# Patient Record
Sex: MALE | Race: WHITE | NOT HISPANIC OR LATINO
[De-identification: names, ages, dates, MRNs, and addresses within clinical notes are randomized per-mention and may not be internally consistent; named-entity substitution may affect disease eponyms.]

---

## 2017-09-17 ENCOUNTER — TRANSCRIPTION ENCOUNTER (OUTPATIENT)
Age: 42
End: 2017-09-17

## 2018-04-20 ENCOUNTER — APPOINTMENT (OUTPATIENT)
Dept: OTHER | Facility: CLINIC | Age: 43
End: 2018-04-20

## 2019-07-16 ENCOUNTER — LABORATORY RESULT (OUTPATIENT)
Age: 44
End: 2019-07-16

## 2019-07-16 ENCOUNTER — APPOINTMENT (OUTPATIENT)
Dept: CARDIOLOGY | Facility: CLINIC | Age: 44
End: 2019-07-16
Payer: COMMERCIAL

## 2019-07-16 ENCOUNTER — NON-APPOINTMENT (OUTPATIENT)
Age: 44
End: 2019-07-16

## 2019-07-16 VITALS
WEIGHT: 270 LBS | HEART RATE: 89 BPM | SYSTOLIC BLOOD PRESSURE: 130 MMHG | HEIGHT: 75 IN | BODY MASS INDEX: 33.57 KG/M2 | DIASTOLIC BLOOD PRESSURE: 88 MMHG | TEMPERATURE: 98.2 F | OXYGEN SATURATION: 96 %

## 2019-07-16 DIAGNOSIS — Z82.69 FAMILY HISTORY OF OTHER DISEASES OF THE MUSCULOSKELETAL SYSTEM AND CONNECTIVE TISSUE: ICD-10-CM

## 2019-07-16 DIAGNOSIS — M25.579 PAIN IN UNSPECIFIED ANKLE AND JOINTS OF UNSPECIFIED FOOT: ICD-10-CM

## 2019-07-16 DIAGNOSIS — Z83.3 FAMILY HISTORY OF DIABETES MELLITUS: ICD-10-CM

## 2019-07-16 PROCEDURE — 99396 PREV VISIT EST AGE 40-64: CPT

## 2019-07-16 PROCEDURE — 93000 ELECTROCARDIOGRAM COMPLETE: CPT

## 2019-07-16 NOTE — HISTORY OF PRESENT ILLNESS
[FreeTextEntry1] : physical  [de-identified] : This is a 43 year old gentlemen with a PMH of Gout, presents today for his annual wellness visit. He states he is feeling good aside for some "normal aches and pains." Patient denies dyspnea, palpitations, chest pain, nausea, vomiting, dizziness and lightheadedness.\par pt concerned about cysts on scalp /neck and back of leg .denies trauma no fever

## 2019-07-16 NOTE — PHYSICAL EXAM
[No Acute Distress] : no acute distress [Well Nourished] : well nourished [Well Developed] : well developed [Well-Appearing] : well-appearing [Normal Sclera/Conjunctiva] : normal sclera/conjunctiva [PERRL] : pupils equal round and reactive to light [EOMI] : extraocular movements intact [Normal Outer Ear/Nose] : the outer ears and nose were normal in appearance [Normal Oropharynx] : the oropharynx was normal [No JVD] : no jugular venous distention [No Lymphadenopathy] : no lymphadenopathy [Supple] : supple [Thyroid Normal, No Nodules] : the thyroid was normal and there were no nodules present [No Respiratory Distress] : no respiratory distress  [No Accessory Muscle Use] : no accessory muscle use [Clear to Auscultation] : lungs were clear to auscultation bilaterally [Normal Rate] : normal rate  [Regular Rhythm] : with a regular rhythm [Normal S1, S2] : normal S1 and S2 [No Murmur] : no murmur heard [No Carotid Bruits] : no carotid bruits [No Abdominal Bruit] : a ~M bruit was not heard ~T in the abdomen [No Varicosities] : no varicosities [Pedal Pulses Present] : the pedal pulses are present [No Edema] : there was no peripheral edema [No Palpable Aorta] : no palpable aorta [No Extremity Clubbing/Cyanosis] : no extremity clubbing/cyanosis [Soft] : abdomen soft [Non Tender] : non-tender [Non-distended] : non-distended [No Masses] : no abdominal mass palpated [No HSM] : no HSM [Normal Bowel Sounds] : normal bowel sounds [Normal Posterior Cervical Nodes] : no posterior cervical lymphadenopathy [Normal Anterior Cervical Nodes] : no anterior cervical lymphadenopathy [No CVA Tenderness] : no CVA  tenderness [No Spinal Tenderness] : no spinal tenderness [No Joint Swelling] : no joint swelling [Grossly Normal Strength/Tone] : grossly normal strength/tone [No Rash] : no rash [Coordination Grossly Intact] : coordination grossly intact [No Focal Deficits] : no focal deficits [Normal Gait] : normal gait [Deep Tendon Reflexes (DTR)] : deep tendon reflexes were 2+ and symmetric [Normal Affect] : the affect was normal [Normal Insight/Judgement] : insight and judgment were intact [FreeTextEntry1] : prostate normal  [de-identified] : Genitals and prostate normal [de-identified] : cyst scalp left and neck and left posterior calf area nevi noted

## 2019-07-24 LAB
25(OH)D3 SERPL-MCNC: 17.5 NG/ML
ALBUMIN SERPL ELPH-MCNC: 4.5 G/DL
ALP BLD-CCNC: 68 U/L
ALT SERPL-CCNC: 48 U/L
ANION GAP SERPL CALC-SCNC: 16 MMOL/L
AST SERPL-CCNC: 31 U/L
BASOPHILS # BLD AUTO: 0.08 K/UL
BASOPHILS NFR BLD AUTO: 1.3 %
BILIRUB SERPL-MCNC: 0.9 MG/DL
BUN SERPL-MCNC: 17 MG/DL
CALCIUM SERPL-MCNC: 9.4 MG/DL
CHLORIDE SERPL-SCNC: 103 MMOL/L
CHOLEST SERPL-MCNC: 157 MG/DL
CHOLEST/HDLC SERPL: 5.6 RATIO
CO2 SERPL-SCNC: 23 MMOL/L
CREAT SERPL-MCNC: 1 MG/DL
EOSINOPHIL # BLD AUTO: 0.11 K/UL
EOSINOPHIL NFR BLD AUTO: 1.8 %
ESTIMATED AVERAGE GLUCOSE: 192 MG/DL
GLUCOSE SERPL-MCNC: 137 MG/DL
HBA1C MFR BLD HPLC: 8.3 %
HCT VFR BLD CALC: 46.2 %
HDLC SERPL-MCNC: 28 MG/DL
HGB BLD-MCNC: 13.7 G/DL
IMM GRANULOCYTES NFR BLD AUTO: 0.5 %
LDLC SERPL CALC-MCNC: 77 MG/DL
LYMPHOCYTES # BLD AUTO: 1.66 K/UL
LYMPHOCYTES NFR BLD AUTO: 26.6 %
MAGNESIUM SERPL-MCNC: 2.1 MG/DL
MAN DIFF?: NORMAL
MCHC RBC-ENTMCNC: 20.1 PG
MCHC RBC-ENTMCNC: 29.7 GM/DL
MCV RBC AUTO: 67.7 FL
MONOCYTES # BLD AUTO: 0.45 K/UL
MONOCYTES NFR BLD AUTO: 7.2 %
NEUTROPHILS # BLD AUTO: 3.9 K/UL
NEUTROPHILS NFR BLD AUTO: 62.6 %
PHOSPHATE SERPL-MCNC: 5 MG/DL
PLATELET # BLD AUTO: 250 K/UL
POTASSIUM SERPL-SCNC: 4.1 MMOL/L
PROT SERPL-MCNC: 7.2 G/DL
PSA SERPL-MCNC: 0.36 NG/ML
RBC # BLD: 6.82 M/UL
RBC # FLD: 17.3 %
SODIUM SERPL-SCNC: 142 MMOL/L
T3RU NFR SERPL: 1.2 TBI
T4 FREE SERPL-MCNC: 1.2 NG/DL
T4 SERPL-MCNC: 7.8 UG/DL
TRIGL SERPL-MCNC: 258 MG/DL
TSH SERPL-ACNC: 1.18 UIU/ML
URATE SERPL-MCNC: 8.1 MG/DL
WBC # FLD AUTO: 6.23 K/UL

## 2019-08-19 ENCOUNTER — APPOINTMENT (OUTPATIENT)
Dept: CARDIOLOGY | Facility: CLINIC | Age: 44
End: 2019-08-19
Payer: COMMERCIAL

## 2019-08-19 ENCOUNTER — TRANSCRIPTION ENCOUNTER (OUTPATIENT)
Age: 44
End: 2019-08-19

## 2019-08-19 PROCEDURE — 93015 CV STRESS TEST SUPVJ I&R: CPT

## 2019-08-19 PROCEDURE — 93306 TTE W/DOPPLER COMPLETE: CPT

## 2019-10-30 ENCOUNTER — APPOINTMENT (OUTPATIENT)
Dept: ENDOCRINOLOGY | Facility: CLINIC | Age: 44
End: 2019-10-30

## 2020-06-02 ENCOUNTER — APPOINTMENT (OUTPATIENT)
Dept: OTHER | Facility: CLINIC | Age: 45
End: 2020-06-02
Payer: COMMERCIAL

## 2020-06-02 PROCEDURE — 99396 PREV VISIT EST AGE 40-64: CPT | Mod: 95

## 2020-06-03 NOTE — DISCUSSION/SUMMARY
[Home] : at home, [unfilled] , at the time of the visit. [Verbal consent obtained from patient] : the patient, [unfilled] [Other Location: e.g. Home (Enter Location, City,State)___] : at [unfilled] [Please See Note in Chart and Documentation in Trial DB] : Please see note in chart and documentation in Trial DB. 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 He states he is feeling good . Patient denies dyspnea, palpitations, chest pain, nausea, vomiting, dizziness and lightheadedness.\par \par  \par Active Problems\par Gout \par Osteoarthritis of foot joint \par Osteophyte of right foot \par Diabetes mellitus \par \par Surgical History\par History of Shoulder Surgery\par \par Family History\par Family history of diabetes mellitus (V18.0) (Z83.3) : Mother\par Family history of gout (V18.19) (Z82.69) : Mother, Father\par \par Social History\par Never a smoker\par No alcohol use\par No drug use\par \par Current Meds\par Colchicine 0.6 MG Oral Capsule\par Metformin \par Allergies\par No Known Drug Allergies\par \par Review of Systems: reviewed in Trial DB \par \par CXR and spirometry deferred \par \par \par Physical Exam: deferred \par  \par Assessment and Plan\par \par [facility] annual MV \par

## 2020-11-09 ENCOUNTER — LABORATORY RESULT (OUTPATIENT)
Age: 45
End: 2020-11-09

## 2020-11-09 ENCOUNTER — NON-APPOINTMENT (OUTPATIENT)
Age: 45
End: 2020-11-09

## 2020-11-09 ENCOUNTER — APPOINTMENT (OUTPATIENT)
Dept: CARDIOLOGY | Facility: CLINIC | Age: 45
End: 2020-11-09
Payer: COMMERCIAL

## 2020-11-09 VITALS
OXYGEN SATURATION: 96 % | BODY MASS INDEX: 33.07 KG/M2 | WEIGHT: 266 LBS | HEART RATE: 66 BPM | SYSTOLIC BLOOD PRESSURE: 122 MMHG | HEIGHT: 75 IN | DIASTOLIC BLOOD PRESSURE: 90 MMHG | TEMPERATURE: 98.2 F

## 2020-11-09 DIAGNOSIS — Z23 ENCOUNTER FOR IMMUNIZATION: ICD-10-CM

## 2020-11-09 DIAGNOSIS — Z20.828 CONTACT WITH AND (SUSPECTED) EXPOSURE TO OTHER VIRAL COMMUNICABLE DISEASES: ICD-10-CM

## 2020-11-09 DIAGNOSIS — L72.9 FOLLICULAR CYST OF THE SKIN AND SUBCUTANEOUS TISSUE, UNSPECIFIED: ICD-10-CM

## 2020-11-09 DIAGNOSIS — Z86.39 PERSONAL HISTORY OF OTHER ENDOCRINE, NUTRITIONAL AND METABOLIC DISEASE: ICD-10-CM

## 2020-11-09 DIAGNOSIS — D17.9 BENIGN LIPOMATOUS NEOPLASM, UNSPECIFIED: ICD-10-CM

## 2020-11-09 PROCEDURE — G0008: CPT

## 2020-11-09 PROCEDURE — 93000 ELECTROCARDIOGRAM COMPLETE: CPT

## 2020-11-09 PROCEDURE — 99396 PREV VISIT EST AGE 40-64: CPT | Mod: 25

## 2020-11-09 PROCEDURE — 99072 ADDL SUPL MATRL&STAF TM PHE: CPT

## 2020-11-09 PROCEDURE — 90686 IIV4 VACC NO PRSV 0.5 ML IM: CPT

## 2020-11-09 NOTE — HISTORY OF PRESENT ILLNESS
[FreeTextEntry1] : Annual Wellness Exam  [de-identified] : This is a 45 year old gentlemen with a PMH of Borderline HTN, HLD, Gout, and DM presents today for annual wellness exam. Patient states that his BS have been elevated and he states he has been feeling fatigued and tired. Patient has not been taking his metformin and has not followed with Dr. Rose. Patient denies dyspnea, palpitations, chest pain, nausea, vomiting, dizziness and lightheadedness.\par

## 2020-11-13 ENCOUNTER — NON-APPOINTMENT (OUTPATIENT)
Age: 45
End: 2020-11-13

## 2020-11-18 DIAGNOSIS — Z71.89 OTHER SPECIFIED COUNSELING: ICD-10-CM

## 2020-11-23 ENCOUNTER — APPOINTMENT (OUTPATIENT)
Dept: ENDOCRINOLOGY | Facility: CLINIC | Age: 45
End: 2020-11-23
Payer: COMMERCIAL

## 2020-11-23 VITALS
HEART RATE: 85 BPM | HEIGHT: 75 IN | TEMPERATURE: 98.5 F | DIASTOLIC BLOOD PRESSURE: 80 MMHG | WEIGHT: 265 LBS | OXYGEN SATURATION: 97 % | SYSTOLIC BLOOD PRESSURE: 118 MMHG | BODY MASS INDEX: 32.95 KG/M2

## 2020-11-23 PROCEDURE — 99072 ADDL SUPL MATRL&STAF TM PHE: CPT

## 2020-11-23 PROCEDURE — 99244 OFF/OP CNSLTJ NEW/EST MOD 40: CPT

## 2020-11-23 PROCEDURE — 82962 GLUCOSE BLOOD TEST: CPT

## 2020-11-23 PROCEDURE — 99214 OFFICE O/P EST MOD 30 MIN: CPT

## 2020-11-23 NOTE — HISTORY OF PRESENT ILLNESS
[FreeTextEntry1] : Mr. LACEY PIERCE  is a 45 year  year-old  male  who presents for initial endocrine evaluation with regard to a hx of type 2 dm. The diabetes has been present for about  under 2 years   years. He denies any history of  retinopathy, neuropathy or  nephropathy.500 mg daily. Recent A1c was 8.3% He has recently cut back on carbs  He   denies polyuria, polydipsia, or any visual changes. He  too denies any skin lesions, skin breakdown or non-healing areas of skin. He too denies any podiatric concerns. Ophthalmologic evaluation is due--not done for some time.Home glucose monitoring from his moms meter showd values nmid 100's-he needs his own meter  He  does deny any hypoglycemia or hypoglycemic signs or symptoms.\par Additional medical history includes that of  gout\par FH Mom with type 2 dm\par Operates heavy equipment\par \par

## 2020-12-06 LAB — GLUCOSE BLDC GLUCOMTR-MCNC: 169

## 2020-12-16 ENCOUNTER — APPOINTMENT (OUTPATIENT)
Dept: CARDIOLOGY | Facility: CLINIC | Age: 45
End: 2020-12-16

## 2021-02-22 ENCOUNTER — APPOINTMENT (OUTPATIENT)
Dept: ENDOCRINOLOGY | Facility: CLINIC | Age: 46
End: 2021-02-22
Payer: COMMERCIAL

## 2021-02-22 VITALS
HEART RATE: 78 BPM | WEIGHT: 270 LBS | BODY MASS INDEX: 33.57 KG/M2 | DIASTOLIC BLOOD PRESSURE: 82 MMHG | RESPIRATION RATE: 16 BRPM | OXYGEN SATURATION: 98 % | SYSTOLIC BLOOD PRESSURE: 120 MMHG | HEIGHT: 75 IN | TEMPERATURE: 98.6 F

## 2021-02-22 PROCEDURE — 36415 COLL VENOUS BLD VENIPUNCTURE: CPT

## 2021-02-22 PROCEDURE — 99214 OFFICE O/P EST MOD 30 MIN: CPT | Mod: 25

## 2021-02-22 PROCEDURE — 99072 ADDL SUPL MATRL&STAF TM PHE: CPT

## 2021-02-22 PROCEDURE — 83036 HEMOGLOBIN GLYCOSYLATED A1C: CPT | Mod: QW

## 2021-02-22 PROCEDURE — 82962 GLUCOSE BLOOD TEST: CPT

## 2021-02-24 LAB
GLUCOSE BLDC GLUCOMTR-MCNC: 117
HBA1C MFR BLD HPLC: 7.1

## 2021-02-28 NOTE — HISTORY OF PRESENT ILLNESS
[FreeTextEntry1] : Mr. LACEY PIERCE  is a 45 year old male who returns  with regard to a hx of type 2 DM. The diabetes has been present for about  under 2 years.. He denies any history of  retinopathy, neuropathy or  nephropathy. He does take Metformin Er 500 mg daily.   He  denies polyuria, polydipsia, or any visual changes. He  too denies any skin lesions, skin breakdown or non-healing areas of skin. He too denies any podiatric concerns. Ophthalmologic evaluation is due--not done for some time .Home glucose monitoring showed values of 120s at bedtime 1 hour after eating. He  does deny any hypoglycemia or hypoglycemic signs or symptoms.\par Additional medical history includes that of gout\par Operates heavy equipment\par States his weight fluctuates\par POCT glucose returned today at 117 mg/dL\par POCT A1c returned today at  7.1%   previously 8.3% 11/2020\par

## 2021-04-28 ENCOUNTER — APPOINTMENT (OUTPATIENT)
Dept: ENDOCRINOLOGY | Facility: CLINIC | Age: 46
End: 2021-04-28

## 2021-06-08 ENCOUNTER — APPOINTMENT (OUTPATIENT)
Dept: OTHER | Facility: CLINIC | Age: 46
End: 2021-06-08
Payer: COMMERCIAL

## 2021-06-08 PROCEDURE — 99396 PREV VISIT EST AGE 40-64: CPT | Mod: 95

## 2021-06-09 NOTE — DISCUSSION/SUMMARY
[Patient seen for WTC Monitoring ___] : Patient was seen for WTC monitoring [unfilled] [Please See Note in Chart and Documentation in Trial DB] : Please see note in chart and documentation in Trial DB. 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 He states he is feeling good . Patient denies dyspnea, palpitations, chest pain, nausea, vomiting, dizziness and lightheadedness.\par \par  \par Active Problems\par Gout \par Osteoarthritis of foot joint \par Osteophyte of right foot \par Diabetes mellitus \par \par Surgical History\par History of Shoulder Surgery\par \par Family History\par Family history of diabetes mellitus (V18.0) (Z83.3) : Mother\par Family history of gout (V18.19) (Z82.69) : Mother, Father\par \par Social History\par Never a smoker\par No alcohol use\par No drug use\par \par \par Allergies\par No Known Drug Allergies\par \par Review of Systems: reviewed in Trial DB \par \par CXR - referred \par \par \par Physical Exam: in trial DB \par  \par Assessment and Plan\par \par WTC annual MV \par

## 2022-03-24 ENCOUNTER — APPOINTMENT (OUTPATIENT)
Dept: CARDIOLOGY | Facility: CLINIC | Age: 47
End: 2022-03-24

## 2022-03-30 ENCOUNTER — APPOINTMENT (OUTPATIENT)
Dept: ENDOCRINOLOGY | Facility: CLINIC | Age: 47
End: 2022-03-30
Payer: COMMERCIAL

## 2022-03-30 VITALS
BODY MASS INDEX: 33.82 KG/M2 | WEIGHT: 272 LBS | DIASTOLIC BLOOD PRESSURE: 80 MMHG | HEART RATE: 83 BPM | TEMPERATURE: 98.6 F | OXYGEN SATURATION: 98 % | SYSTOLIC BLOOD PRESSURE: 138 MMHG | RESPIRATION RATE: 15 BRPM | HEIGHT: 75 IN

## 2022-03-30 DIAGNOSIS — Z86.16 PERSONAL HISTORY OF COVID-19: ICD-10-CM

## 2022-03-30 LAB
GLUCOSE BLDC GLUCOMTR-MCNC: 194
HBA1C MFR BLD HPLC: 11.5

## 2022-03-30 PROCEDURE — 82962 GLUCOSE BLOOD TEST: CPT | Mod: NC

## 2022-03-30 PROCEDURE — 99214 OFFICE O/P EST MOD 30 MIN: CPT

## 2022-03-30 PROCEDURE — 83036 HEMOGLOBIN GLYCOSYLATED A1C: CPT | Mod: QW

## 2022-03-30 NOTE — HISTORY OF PRESENT ILLNESS
[FreeTextEntry1] : Mr. LACEY PEIRCE is a 46 year old male who returns with regard to a hx of type 2 DM. The diabetes has been present for about under 2 years.. He denies any history of retinopathy, neuropathy or nephropathy. He does take Metformin Er 500 mg BID but did ran out of prescription ( about 7 months), He denies polyuria, polydipsia, or any visual changes. He too denies any skin lesions, skin breakdown or non-healing areas of skin. He too denies any podiatric concerns. Ophthalmologic evaluation is due--not done for some time. He has not tested his glucose levels at home and ran out of testing strips. He does deny any hypoglycemia or hypoglycemic signs or symptoms.\par \par Additional medical history includes that of gout\par Medications - colchicine as needed - no over the counter supplements \par Operates heavy equipment\par States his weight fluctuates\par \par POCT glucose returned today at 194 mg/dL\par POCT A1c returned today at 11.5% ; previously 2/22/2022 at 7.1% previously 8.3% 11/2020\par \par Covid Infection - 12/2021 not hospitalized; no residual symptoms \par Denies any recent hospitalizations

## 2022-03-31 LAB
25(OH)D3 SERPL-MCNC: 17.2 NG/ML
ALBUMIN SERPL ELPH-MCNC: 4.4 G/DL
ALP BLD-CCNC: 78 U/L
ALT SERPL-CCNC: 49 U/L
ANION GAP SERPL CALC-SCNC: 15 MMOL/L
AST SERPL-CCNC: 31 U/L
BILIRUB SERPL-MCNC: 0.8 MG/DL
BUN SERPL-MCNC: 14 MG/DL
CALCIUM SERPL-MCNC: 9.2 MG/DL
CHLORIDE SERPL-SCNC: 102 MMOL/L
CHOLEST SERPL-MCNC: 151 MG/DL
CO2 SERPL-SCNC: 22 MMOL/L
CREAT SERPL-MCNC: 0.95 MG/DL
CREAT SPEC-SCNC: 158 MG/DL
EGFR: 99 ML/MIN/1.73M2
FRUCTOSAMINE SERPL-MCNC: 339 UMOL/L
GLUCOSE SERPL-MCNC: 178 MG/DL
GLYCOMARK.: 1.4 UG/ML
HDLC SERPL-MCNC: 26 MG/DL
LDLC SERPL CALC-MCNC: 63 MG/DL
MICROALBUMIN 24H UR DL<=1MG/L-MCNC: <1.2 MG/DL
MICROALBUMIN/CREAT 24H UR-RTO: NORMAL MG/G
NONHDLC SERPL-MCNC: 125 MG/DL
POTASSIUM SERPL-SCNC: 4.2 MMOL/L
PROT SERPL-MCNC: 7.3 G/DL
SODIUM SERPL-SCNC: 139 MMOL/L
T3FREE SERPL-MCNC: 3.32 PG/ML
T4 FREE SERPL-MCNC: 1.1 NG/DL
TRIGL SERPL-MCNC: 311 MG/DL
TSH SERPL-ACNC: 1.04 UIU/ML
URATE SERPL-MCNC: 6 MG/DL

## 2022-03-31 RX ORDER — CHOLECALCIFEROL (VITAMIN D3) 1250 MCG
1.25 MG CAPSULE ORAL
Qty: 12 | Refills: 0 | Status: ACTIVE | COMMUNITY
Start: 2022-03-31 | End: 1900-01-01

## 2022-04-06 ENCOUNTER — APPOINTMENT (OUTPATIENT)
Dept: CARDIOLOGY | Facility: CLINIC | Age: 47
End: 2022-04-06
Payer: COMMERCIAL

## 2022-04-06 ENCOUNTER — NON-APPOINTMENT (OUTPATIENT)
Age: 47
End: 2022-04-06

## 2022-04-06 VITALS
OXYGEN SATURATION: 98 % | SYSTOLIC BLOOD PRESSURE: 130 MMHG | TEMPERATURE: 97.5 F | HEART RATE: 86 BPM | DIASTOLIC BLOOD PRESSURE: 82 MMHG | HEIGHT: 75 IN | WEIGHT: 267 LBS | BODY MASS INDEX: 33.2 KG/M2

## 2022-04-06 DIAGNOSIS — Z13.6 ENCOUNTER FOR SCREENING FOR CARDIOVASCULAR DISORDERS: ICD-10-CM

## 2022-04-06 DIAGNOSIS — R94.5 ABNORMAL RESULTS OF LIVER FUNCTION STUDIES: ICD-10-CM

## 2022-04-06 DIAGNOSIS — D22.9 MELANOCYTIC NEVI, UNSPECIFIED: ICD-10-CM

## 2022-04-06 DIAGNOSIS — Z12.5 ENCOUNTER FOR SCREENING FOR MALIGNANT NEOPLASM OF PROSTATE: ICD-10-CM

## 2022-04-06 PROCEDURE — 99396 PREV VISIT EST AGE 40-64: CPT

## 2022-04-06 PROCEDURE — 93000 ELECTROCARDIOGRAM COMPLETE: CPT

## 2022-04-06 NOTE — PHYSICAL EXAM
[No Acute Distress] : no acute distress [Well Nourished] : well nourished [Well Developed] : well developed [Well-Appearing] : well-appearing [Normal Sclera/Conjunctiva] : normal sclera/conjunctiva [PERRL] : pupils equal round and reactive to light [EOMI] : extraocular movements intact [Normal Outer Ear/Nose] : the outer ears and nose were normal in appearance [Normal Oropharynx] : the oropharynx was normal [No JVD] : no jugular venous distention [No Lymphadenopathy] : no lymphadenopathy [Supple] : supple [Thyroid Normal, No Nodules] : the thyroid was normal and there were no nodules present [No Respiratory Distress] : no respiratory distress  [No Accessory Muscle Use] : no accessory muscle use [Clear to Auscultation] : lungs were clear to auscultation bilaterally [Normal Rate] : normal rate  [Regular Rhythm] : with a regular rhythm [Normal S1, S2] : normal S1 and S2 [No Murmur] : no murmur heard [No Carotid Bruits] : no carotid bruits [No Abdominal Bruit] : a ~M bruit was not heard ~T in the abdomen [No Varicosities] : no varicosities [Pedal Pulses Present] : the pedal pulses are present [No Edema] : there was no peripheral edema [No Palpable Aorta] : no palpable aorta [No Extremity Clubbing/Cyanosis] : no extremity clubbing/cyanosis [Soft] : abdomen soft [Non Tender] : non-tender [Non-distended] : non-distended [No Masses] : no abdominal mass palpated [No HSM] : no HSM [Normal Bowel Sounds] : normal bowel sounds [Normal Posterior Cervical Nodes] : no posterior cervical lymphadenopathy [Normal Anterior Cervical Nodes] : no anterior cervical lymphadenopathy [No CVA Tenderness] : no CVA  tenderness [No Spinal Tenderness] : no spinal tenderness [No Joint Swelling] : no joint swelling [Grossly Normal Strength/Tone] : grossly normal strength/tone [No Rash] : no rash [Coordination Grossly Intact] : coordination grossly intact [No Focal Deficits] : no focal deficits [Normal Gait] : normal gait [Deep Tendon Reflexes (DTR)] : deep tendon reflexes were 2+ and symmetric [Normal Affect] : the affect was normal [Normal Insight/Judgement] : insight and judgment were intact [FreeTextEntry1] : refuses rectal exam  [de-identified] : refused genital exam  [de-identified] : nevi

## 2022-04-06 NOTE — HISTORY OF PRESENT ILLNESS
[FreeTextEntry1] : Annual Physical [de-identified] : This is a 46 year old gentlemen with a PMH of Borderline HTN, HLD, Gout, and DM presents today for annual wellness exam. He has no acute complaints or concerns at todays visit. He had follow up with Dr. Rose and is now compliant with his DM medicaitons and has been checking his BG readings regularly. Denies chest pain, dyspnea, dizziness, palpations, changes in appetite/bowel habits, nausea, vomiting, abdominal pain.\par

## 2022-04-11 LAB — 17OHP SERPL-MCNC: 45 NG/DL

## 2022-04-18 ENCOUNTER — EMERGENCY (EMERGENCY)
Facility: HOSPITAL | Age: 47
LOS: 1 days | Discharge: HOME | End: 2022-04-18
Attending: EMERGENCY MEDICINE
Payer: COMMERCIAL

## 2022-04-18 ENCOUNTER — NON-APPOINTMENT (OUTPATIENT)
Age: 47
End: 2022-04-18

## 2022-04-18 VITALS
TEMPERATURE: 98 F | DIASTOLIC BLOOD PRESSURE: 93 MMHG | OXYGEN SATURATION: 100 % | HEIGHT: 74 IN | HEART RATE: 72 BPM | SYSTOLIC BLOOD PRESSURE: 184 MMHG | WEIGHT: 263.01 LBS | RESPIRATION RATE: 20 BRPM

## 2022-04-18 VITALS
DIASTOLIC BLOOD PRESSURE: 89 MMHG | HEART RATE: 73 BPM | OXYGEN SATURATION: 100 % | RESPIRATION RATE: 19 BRPM | SYSTOLIC BLOOD PRESSURE: 168 MMHG | TEMPERATURE: 98 F

## 2022-04-18 DIAGNOSIS — Z98.89 OTHER SPECIFIED POSTPROCEDURAL STATES: Chronic | ICD-10-CM

## 2022-04-18 LAB
ALBUMIN SERPL ELPH-MCNC: 4.4 G/DL — SIGNIFICANT CHANGE UP (ref 3.3–5)
ALP SERPL-CCNC: 68 U/L — SIGNIFICANT CHANGE UP (ref 40–120)
ALT FLD-CCNC: 41 U/L — SIGNIFICANT CHANGE UP (ref 10–45)
ANION GAP SERPL CALC-SCNC: 16 MMOL/L — SIGNIFICANT CHANGE UP (ref 5–17)
ANISOCYTOSIS BLD QL: SIGNIFICANT CHANGE UP
APPEARANCE UR: CLEAR — SIGNIFICANT CHANGE UP
AST SERPL-CCNC: 35 U/L — SIGNIFICANT CHANGE UP (ref 10–40)
BACTERIA # UR AUTO: NEGATIVE — SIGNIFICANT CHANGE UP
BASOPHILS # BLD AUTO: 0.07 K/UL — SIGNIFICANT CHANGE UP (ref 0–0.2)
BASOPHILS NFR BLD AUTO: 0.9 % — SIGNIFICANT CHANGE UP (ref 0–2)
BILIRUB SERPL-MCNC: 0.5 MG/DL — SIGNIFICANT CHANGE UP (ref 0.2–1.2)
BILIRUB UR-MCNC: NEGATIVE — SIGNIFICANT CHANGE UP
BUN SERPL-MCNC: 18 MG/DL — SIGNIFICANT CHANGE UP (ref 7–23)
CALCIUM SERPL-MCNC: 9.3 MG/DL — SIGNIFICANT CHANGE UP (ref 8.4–10.5)
CHLORIDE SERPL-SCNC: 103 MMOL/L — SIGNIFICANT CHANGE UP (ref 96–108)
CO2 SERPL-SCNC: 21 MMOL/L — LOW (ref 22–31)
COLOR SPEC: COLORLESS — SIGNIFICANT CHANGE UP
CREAT SERPL-MCNC: 0.94 MG/DL — SIGNIFICANT CHANGE UP (ref 0.5–1.3)
DACRYOCYTES BLD QL SMEAR: SLIGHT — SIGNIFICANT CHANGE UP
DIFF PNL FLD: NEGATIVE — SIGNIFICANT CHANGE UP
EGFR: 100 ML/MIN/1.73M2 — SIGNIFICANT CHANGE UP
ELLIPTOCYTES BLD QL SMEAR: SLIGHT — SIGNIFICANT CHANGE UP
EOSINOPHIL # BLD AUTO: 0.06 K/UL — SIGNIFICANT CHANGE UP (ref 0–0.5)
EOSINOPHIL NFR BLD AUTO: 0.8 % — SIGNIFICANT CHANGE UP (ref 0–6)
EPI CELLS # UR: 0 /HPF — SIGNIFICANT CHANGE UP
GLUCOSE SERPL-MCNC: 129 MG/DL — HIGH (ref 70–99)
GLUCOSE UR QL: NEGATIVE — SIGNIFICANT CHANGE UP
HCT VFR BLD CALC: 41.6 % — SIGNIFICANT CHANGE UP (ref 39–50)
HGB BLD-MCNC: 12.8 G/DL — LOW (ref 13–17)
HYALINE CASTS # UR AUTO: 1 /LPF — SIGNIFICANT CHANGE UP (ref 0–2)
KETONES UR-MCNC: SIGNIFICANT CHANGE UP
LEUKOCYTE ESTERASE UR-ACNC: NEGATIVE — SIGNIFICANT CHANGE UP
LIDOCAIN IGE QN: 124 U/L — HIGH (ref 7–60)
LYMPHOCYTES # BLD AUTO: 1.1 K/UL — SIGNIFICANT CHANGE UP (ref 1–3.3)
LYMPHOCYTES # BLD AUTO: 14.9 % — SIGNIFICANT CHANGE UP (ref 13–44)
MANUAL SMEAR VERIFICATION: SIGNIFICANT CHANGE UP
MCHC RBC-ENTMCNC: 19.9 PG — LOW (ref 27–34)
MCHC RBC-ENTMCNC: 30.8 GM/DL — LOW (ref 32–36)
MCV RBC AUTO: 64.8 FL — LOW (ref 80–100)
METAMYELOCYTES # FLD: 0.9 % — HIGH (ref 0–0)
MICROCYTES BLD QL: SIGNIFICANT CHANGE UP
MONOCYTES # BLD AUTO: 0.32 K/UL — SIGNIFICANT CHANGE UP (ref 0–0.9)
MONOCYTES NFR BLD AUTO: 4.4 % — SIGNIFICANT CHANGE UP (ref 2–14)
NEUTROPHILS # BLD AUTO: 5.75 K/UL — SIGNIFICANT CHANGE UP (ref 1.8–7.4)
NEUTROPHILS NFR BLD AUTO: 77.2 % — HIGH (ref 43–77)
NEUTS BAND # BLD: 0.9 % — SIGNIFICANT CHANGE UP (ref 0–8)
NITRITE UR-MCNC: NEGATIVE — SIGNIFICANT CHANGE UP
OVALOCYTES BLD QL SMEAR: SIGNIFICANT CHANGE UP
PH UR: 6 — SIGNIFICANT CHANGE UP (ref 5–8)
PLAT MORPH BLD: NORMAL — SIGNIFICANT CHANGE UP
PLATELET # BLD AUTO: 265 K/UL — SIGNIFICANT CHANGE UP (ref 150–400)
POIKILOCYTOSIS BLD QL AUTO: SLIGHT — SIGNIFICANT CHANGE UP
POTASSIUM SERPL-MCNC: 4.1 MMOL/L — SIGNIFICANT CHANGE UP (ref 3.5–5.3)
POTASSIUM SERPL-SCNC: 4.1 MMOL/L — SIGNIFICANT CHANGE UP (ref 3.5–5.3)
PROT SERPL-MCNC: 7.4 G/DL — SIGNIFICANT CHANGE UP (ref 6–8.3)
PROT UR-MCNC: NEGATIVE — SIGNIFICANT CHANGE UP
RBC # BLD: 6.42 M/UL — HIGH (ref 4.2–5.8)
RBC # FLD: 16.3 % — HIGH (ref 10.3–14.5)
RBC BLD AUTO: ABNORMAL
SARS-COV-2 RNA SPEC QL NAA+PROBE: SIGNIFICANT CHANGE UP
SODIUM SERPL-SCNC: 140 MMOL/L — SIGNIFICANT CHANGE UP (ref 135–145)
SP GR SPEC: 1.02 — SIGNIFICANT CHANGE UP (ref 1.01–1.02)
TARGETS BLD QL SMEAR: SLIGHT — SIGNIFICANT CHANGE UP
UROBILINOGEN FLD QL: NEGATIVE — SIGNIFICANT CHANGE UP
WBC # BLD: 7.36 K/UL — SIGNIFICANT CHANGE UP (ref 3.8–10.5)
WBC # FLD AUTO: 7.36 K/UL — SIGNIFICANT CHANGE UP (ref 3.8–10.5)
WBC UR QL: 0 /HPF — SIGNIFICANT CHANGE UP (ref 0–5)

## 2022-04-18 PROCEDURE — 87086 URINE CULTURE/COLONY COUNT: CPT

## 2022-04-18 PROCEDURE — 81001 URINALYSIS AUTO W/SCOPE: CPT

## 2022-04-18 PROCEDURE — 74177 CT ABD & PELVIS W/CONTRAST: CPT | Mod: MA

## 2022-04-18 PROCEDURE — 96374 THER/PROPH/DIAG INJ IV PUSH: CPT | Mod: XU

## 2022-04-18 PROCEDURE — 80053 COMPREHEN METABOLIC PANEL: CPT

## 2022-04-18 PROCEDURE — 93005 ELECTROCARDIOGRAM TRACING: CPT

## 2022-04-18 PROCEDURE — 83690 ASSAY OF LIPASE: CPT

## 2022-04-18 PROCEDURE — 96375 TX/PRO/DX INJ NEW DRUG ADDON: CPT

## 2022-04-18 PROCEDURE — 85025 COMPLETE CBC W/AUTO DIFF WBC: CPT

## 2022-04-18 PROCEDURE — 99285 EMERGENCY DEPT VISIT HI MDM: CPT | Mod: 25

## 2022-04-18 PROCEDURE — 93010 ELECTROCARDIOGRAM REPORT: CPT | Mod: NC

## 2022-04-18 PROCEDURE — 87635 SARS-COV-2 COVID-19 AMP PRB: CPT

## 2022-04-18 PROCEDURE — 76705 ECHO EXAM OF ABDOMEN: CPT

## 2022-04-18 PROCEDURE — 74177 CT ABD & PELVIS W/CONTRAST: CPT | Mod: 26,MA

## 2022-04-18 RX ORDER — ONDANSETRON 8 MG/1
4 TABLET, FILM COATED ORAL ONCE
Refills: 0 | Status: COMPLETED | OUTPATIENT
Start: 2022-04-18 | End: 2022-04-18

## 2022-04-18 RX ORDER — SUCRALFATE 1 G
1 TABLET ORAL ONCE
Refills: 0 | Status: COMPLETED | OUTPATIENT
Start: 2022-04-18 | End: 2022-04-18

## 2022-04-18 RX ORDER — SODIUM CHLORIDE 9 MG/ML
1000 INJECTION INTRAMUSCULAR; INTRAVENOUS; SUBCUTANEOUS ONCE
Refills: 0 | Status: COMPLETED | OUTPATIENT
Start: 2022-04-18 | End: 2022-04-18

## 2022-04-18 RX ORDER — KETOROLAC TROMETHAMINE 30 MG/ML
15 SYRINGE (ML) INJECTION ONCE
Refills: 0 | Status: DISCONTINUED | OUTPATIENT
Start: 2022-04-18 | End: 2022-04-18

## 2022-04-18 RX ORDER — FAMOTIDINE 10 MG/ML
20 INJECTION INTRAVENOUS ONCE
Refills: 0 | Status: COMPLETED | OUTPATIENT
Start: 2022-04-18 | End: 2022-04-18

## 2022-04-18 RX ORDER — MORPHINE SULFATE 50 MG/1
4 CAPSULE, EXTENDED RELEASE ORAL ONCE
Refills: 0 | Status: DISCONTINUED | OUTPATIENT
Start: 2022-04-18 | End: 2022-04-18

## 2022-04-18 RX ADMIN — FAMOTIDINE 20 MILLIGRAM(S): 10 INJECTION INTRAVENOUS at 06:21

## 2022-04-18 RX ADMIN — SODIUM CHLORIDE 1000 MILLILITER(S): 9 INJECTION INTRAMUSCULAR; INTRAVENOUS; SUBCUTANEOUS at 03:27

## 2022-04-18 RX ADMIN — Medication 15 MILLIGRAM(S): at 03:28

## 2022-04-18 RX ADMIN — MORPHINE SULFATE 4 MILLIGRAM(S): 50 CAPSULE, EXTENDED RELEASE ORAL at 03:52

## 2022-04-18 RX ADMIN — ONDANSETRON 4 MILLIGRAM(S): 8 TABLET, FILM COATED ORAL at 03:27

## 2022-04-18 RX ADMIN — Medication 1 GRAM(S): at 06:21

## 2022-04-18 NOTE — ED PROVIDER NOTE - CLINICAL SUMMARY MEDICAL DECISION MAKING FREE TEXT BOX
45 yo M with PMH of DM, no PSH p/w l sided abdominal pain since 10 pm (4hours). This is concerning for kidney stones vs. pancreatitis vs. diverticulitis. Pt writhing, can't sit still, appears uncomfortable in exam room. on exam + CVA tenderness. Pt seems like he has a kidney stone, will get CTAP, cbc, cmp, ua, uc, ecg, covid, lipase. will give toradol, fluids, zofran and reassess.

## 2022-04-18 NOTE — ED PROVIDER NOTE - PHYSICAL EXAMINATION
General: WN/WD NAD  Head: Atraumatic, normocephalic  Eyes: EOM grossly in tact, no scleral icterus, no discharge  ENT: moist mucous membranes  Neurology: A&Ox 3, nonfocal, BLUNT x 4  Respiratory: CTAB, no wheezing, normal respiratory effort  CV: RRR, good s1/s2, no S3, Extremities warm and well perfused  Abdominal: Soft, non-distended, TTP in L flank  Extremities: No edema, no deformities  Skin: warm and dry. No rashes  Back: + CVA tenderness on L

## 2022-04-18 NOTE — ED PROVIDER NOTE - PROGRESS NOTE DETAILS
Estefani Srivastava, PGY-1, EM:  Pt re-assessed, still in significant amount of pain. Will order additional medications.   lipase slightly elevated, pt does not drink. Estefani Srivastava, PGY-1, EM:  PT having some improvement of pain. Kidney stone present on CT, awaiting official results. Estefani Srivastava, PGY-1, EM:  PT having some improvement of pain. Estefani Srivastava, PGY-1, EM: Incidental finding found on patient imaging discussed.

## 2022-04-18 NOTE — ED PROVIDER NOTE - ATTENDING CONTRIBUTION TO CARE
Attending Statement (NIR Babin MD):    HPI: 47y/o M with h/o DM, gout, c/o left flank/abdominal pain which began suddenly approximately 4 hours prior to arrival; severe/constant, non-radiating, +nausea, 1 episode nb/nb vomiting at pain onset. no fever.  no groin/testicular pain    Review of Systems:  -General: no fever   -ENT: no congestion, no difficulty swallowing  -Pulmonary: no cough, no shortness of breath  -Cardiac: no chest pain, no palpitations  -Gastrointestinal: no abdominal pain, no nausea, no vomiting, and no diarrhea.  -Genitourinary: no blood or pain with urination  -Musculoskeletal: no back or neck pain  -Skin: no rashes  -Endocrine: No h/o diabetes   -Neurologic: No new weakness or numbness in extremities    All else negative unless otherwise specified elsewhere in this note.    PSH/PMH as noted above    On Physical Exam:  General: appears uncomfortable but is awake/alert, speaking clearly in full sentences and without difficulty; cooperative with exam  HEENT: anicteric sclera  Neck: no JVD  Cardiac: normal s1, s2; RRR; no MGR  Lungs: CTABL  Abdomen: soft nontender/nondistended  Back: +L CVA tenderness  : no bladder tenderness or distension  Skin: intact, no rash  Extremities: no peripheral edema, no gross deformities      MDM: L flank pain, acute onset, concerning for renal colic; less likely diverticulitis/colitis (lack of other GI symptoms); unlikely aortic catastrophe; will check screening labs: cbc (to evaluate for leukocytosis or anemia), CMP (to evaluate for electrolyte abnormalities or renal/liver dysfunction) and ua; obtain CTAP to further evaluate; offer pain medication prn.  disposition pending review of labs/imaging.

## 2022-04-18 NOTE — ED PROVIDER NOTE - OBJECTIVE STATEMENT
47 yo M with PMH of DM, no PSH p/w l sided abdominal pain since 10 pm (4hours). Pt states pain is sharp, radiating towards his groin. He's never felt it before. States the pain is constant. 1 episode of NBNB vomiting. No fevers, no diarrhea, no constipation, no HA. No past abdominal surgeries. No recent travel. Hasn't taken any pain medications. Urinating like normal.

## 2022-04-18 NOTE — ED ADULT NURSE NOTE - OBJECTIVE STATEMENT
45 yo M with PMH of DM, no PSH p/w l sided abdominal pain since 10 pm (4hours). Pt states pain is sharp, radiating towards his groin. He's never felt it before. States the pain is constant. 1 episode of NBNB vomiting. No fevers, no diarrhea, no constipation, no HA. No past abdominal surgeries. No recent travel. Hasn't taken any pain medications. Urinating like normal. IV placed, labs drawn and sent, medicated as ordered, seen and eval by MD.

## 2022-04-18 NOTE — ED PROVIDER NOTE - PATIENT PORTAL LINK FT
You can access the FollowMyHealth Patient Portal offered by [facility] by registering at the following website: [email or web address]. By joining [facility]’s FollowMyHealth portal, you will also be able to view your health information using other applications (apps) compatible with our system.

## 2022-04-18 NOTE — ED PROVIDER NOTE - NSFOLLOWUPINSTRUCTIONS_ED_ALL_ED_FT
Please follow up with your primary care physician within 2-3 days.  Return to the ER for any new or concerning symptoms.  You may take 650 mg acetaminophen every eight hours as needed for pain.  You may take 600mg Ibuprofen (Advil) once every 8 hours as needed for pain. See medication label for warnings and use instructions.    You had a CT scan, which had some incidental findings. Make sure to discuss with your primary doctor, though this is unlikely to have caused your pain. <Tiny ovoid fat density structure in the left lower quadrant adjacent to the colon measuring 7 mm, may represent intraperitoneal focal fat infarction.>    You were also found to have a dilated GB and gallstones. You had a ultrasound showing no signs of acute cholecystitis. However if you develop right sided pain, especially after meals you may need it removed.    See attached information on gallstones.     Follow up with a gastroenterologist, a referral was provided for you.

## 2022-04-19 LAB
CULTURE RESULTS: NO GROWTH — SIGNIFICANT CHANGE UP
SPECIMEN SOURCE: SIGNIFICANT CHANGE UP

## 2022-04-19 NOTE — ED POST DISCHARGE NOTE - RESULT SUMMARY
IF: CT a/p resulted, progress note and pt d/c paperwork acknowledged incidental findings. No need to contact pt - Camila Vargas PA-C

## 2022-04-20 ENCOUNTER — LABORATORY RESULT (OUTPATIENT)
Age: 47
End: 2022-04-20

## 2022-04-20 ENCOUNTER — APPOINTMENT (OUTPATIENT)
Dept: CARDIOLOGY | Facility: CLINIC | Age: 47
End: 2022-04-20
Payer: COMMERCIAL

## 2022-04-20 ENCOUNTER — NON-APPOINTMENT (OUTPATIENT)
Age: 47
End: 2022-04-20

## 2022-04-20 VITALS
DIASTOLIC BLOOD PRESSURE: 80 MMHG | HEIGHT: 75 IN | HEART RATE: 75 BPM | OXYGEN SATURATION: 99 % | BODY MASS INDEX: 33.2 KG/M2 | SYSTOLIC BLOOD PRESSURE: 138 MMHG | TEMPERATURE: 98 F | WEIGHT: 267 LBS

## 2022-04-20 DIAGNOSIS — Z87.442 PERSONAL HISTORY OF URINARY CALCULI: ICD-10-CM

## 2022-04-20 PROCEDURE — 93000 ELECTROCARDIOGRAM COMPLETE: CPT

## 2022-04-20 PROCEDURE — 99214 OFFICE O/P EST MOD 30 MIN: CPT

## 2022-04-20 NOTE — HISTORY OF PRESENT ILLNESS
[FreeTextEntry1] : This is a 45 yo male who presents to the office for follow up pt went to the ER yesterday for abdominal pain CT a/p with gallstones sono negative for cholecystitis pt also found to have small non obstructing kidney stones pt was given IVF and Ketoralac and was instructed to follow up with GI and urology \par \par pt reports he feels much better \par Denies any recurrence of abdominal pain \par Denies any fever chills N/V\par Denies any hematuria dysuria \par \par of note pt states prior to above event he took his DM medications together with 2 colchicine tabs and believes abdominal pain may have been due to that. \par

## 2022-04-21 ENCOUNTER — APPOINTMENT (OUTPATIENT)
Dept: ULTRASOUND IMAGING | Facility: CLINIC | Age: 47
End: 2022-04-21
Payer: COMMERCIAL

## 2022-04-21 ENCOUNTER — TRANSCRIPTION ENCOUNTER (OUTPATIENT)
Age: 47
End: 2022-04-21

## 2022-04-21 ENCOUNTER — EMERGENCY (EMERGENCY)
Facility: HOSPITAL | Age: 47
LOS: 1 days | Discharge: HOME | End: 2022-04-21
Attending: STUDENT IN AN ORGANIZED HEALTH CARE EDUCATION/TRAINING PROGRAM
Payer: COMMERCIAL

## 2022-04-21 ENCOUNTER — OUTPATIENT (OUTPATIENT)
Dept: OUTPATIENT SERVICES | Facility: HOSPITAL | Age: 47
LOS: 1 days | End: 2022-04-21
Payer: COMMERCIAL

## 2022-04-21 VITALS
RESPIRATION RATE: 18 BRPM | SYSTOLIC BLOOD PRESSURE: 148 MMHG | OXYGEN SATURATION: 100 % | DIASTOLIC BLOOD PRESSURE: 95 MMHG | WEIGHT: 266.98 LBS | TEMPERATURE: 98 F | HEART RATE: 70 BPM | HEIGHT: 75 IN

## 2022-04-21 DIAGNOSIS — K80.20 CALCULUS OF GALLBLADDER WITHOUT CHOLECYSTITIS WITHOUT OBSTRUCTION: ICD-10-CM

## 2022-04-21 DIAGNOSIS — Z98.89 OTHER SPECIFIED POSTPROCEDURAL STATES: Chronic | ICD-10-CM

## 2022-04-21 PROCEDURE — 76700 US EXAM ABDOM COMPLETE: CPT

## 2022-04-21 PROCEDURE — 76700 US EXAM ABDOM COMPLETE: CPT | Mod: 26

## 2022-04-21 NOTE — ED ADULT TRIAGE NOTE - CHIEF COMPLAINT QUOTE
Sunday had pain to left abd to right flank. Sonogram done today, and pcp refereed to come to ED r/o infection.

## 2022-04-22 ENCOUNTER — NON-APPOINTMENT (OUTPATIENT)
Age: 47
End: 2022-04-22

## 2022-04-22 VITALS
RESPIRATION RATE: 18 BRPM | SYSTOLIC BLOOD PRESSURE: 130 MMHG | DIASTOLIC BLOOD PRESSURE: 97 MMHG | OXYGEN SATURATION: 100 % | HEART RATE: 72 BPM

## 2022-04-22 LAB
ALBUMIN SERPL ELPH-MCNC: 4 G/DL — SIGNIFICANT CHANGE UP (ref 3.3–5)
ALP SERPL-CCNC: 59 U/L — SIGNIFICANT CHANGE UP (ref 40–120)
ALT FLD-CCNC: 38 U/L — SIGNIFICANT CHANGE UP (ref 10–45)
ANION GAP SERPL CALC-SCNC: 15 MMOL/L — SIGNIFICANT CHANGE UP (ref 5–17)
ANISOCYTOSIS BLD QL: SLIGHT — SIGNIFICANT CHANGE UP
APPEARANCE UR: CLEAR — SIGNIFICANT CHANGE UP
APTT BLD: 27.4 SEC — LOW (ref 27.5–35.5)
AST SERPL-CCNC: 29 U/L — SIGNIFICANT CHANGE UP (ref 10–40)
BACTERIA # UR AUTO: NEGATIVE — SIGNIFICANT CHANGE UP
BASE EXCESS BLDV CALC-SCNC: 0.2 MMOL/L — SIGNIFICANT CHANGE UP (ref -2–2)
BASOPHILS # BLD AUTO: 0.05 K/UL — SIGNIFICANT CHANGE UP (ref 0–0.2)
BASOPHILS NFR BLD AUTO: 0.9 % — SIGNIFICANT CHANGE UP (ref 0–2)
BILIRUB SERPL-MCNC: 0.6 MG/DL — SIGNIFICANT CHANGE UP (ref 0.2–1.2)
BILIRUB UR-MCNC: NEGATIVE — SIGNIFICANT CHANGE UP
BUN SERPL-MCNC: 17 MG/DL — SIGNIFICANT CHANGE UP (ref 7–23)
CA-I SERPL-SCNC: 1.17 MMOL/L — SIGNIFICANT CHANGE UP (ref 1.15–1.33)
CALCIUM SERPL-MCNC: 9 MG/DL — SIGNIFICANT CHANGE UP (ref 8.4–10.5)
CHLORIDE BLDV-SCNC: 104 MMOL/L — SIGNIFICANT CHANGE UP (ref 96–108)
CHLORIDE SERPL-SCNC: 105 MMOL/L — SIGNIFICANT CHANGE UP (ref 96–108)
CO2 BLDV-SCNC: 27 MMOL/L — HIGH (ref 22–26)
CO2 SERPL-SCNC: 21 MMOL/L — LOW (ref 22–31)
COLOR SPEC: YELLOW — SIGNIFICANT CHANGE UP
CREAT SERPL-MCNC: 0.89 MG/DL — SIGNIFICANT CHANGE UP (ref 0.5–1.3)
DACRYOCYTES BLD QL SMEAR: SLIGHT — SIGNIFICANT CHANGE UP
DIFF PNL FLD: NEGATIVE — SIGNIFICANT CHANGE UP
EGFR: 106 ML/MIN/1.73M2 — SIGNIFICANT CHANGE UP
EOSINOPHIL # BLD AUTO: 0.14 K/UL — SIGNIFICANT CHANGE UP (ref 0–0.5)
EOSINOPHIL NFR BLD AUTO: 2.7 % — SIGNIFICANT CHANGE UP (ref 0–6)
EPI CELLS # UR: 0 /HPF — SIGNIFICANT CHANGE UP
GAS PNL BLDV: 137 MMOL/L — SIGNIFICANT CHANGE UP (ref 136–145)
GAS PNL BLDV: SIGNIFICANT CHANGE UP
GAS PNL BLDV: SIGNIFICANT CHANGE UP
GLUCOSE BLDC GLUCOMTR-MCNC: 119 MG/DL — HIGH (ref 70–99)
GLUCOSE BLDC GLUCOMTR-MCNC: 129 MG/DL — HIGH (ref 70–99)
GLUCOSE BLDV-MCNC: 153 MG/DL — HIGH (ref 70–99)
GLUCOSE SERPL-MCNC: 152 MG/DL — HIGH (ref 70–99)
GLUCOSE UR QL: NEGATIVE — SIGNIFICANT CHANGE UP
HCO3 BLDV-SCNC: 26 MMOL/L — SIGNIFICANT CHANGE UP (ref 22–29)
HCT VFR BLD CALC: 39 % — SIGNIFICANT CHANGE UP (ref 39–50)
HCT VFR BLDA CALC: 37 % — LOW (ref 39–51)
HGB BLD CALC-MCNC: 12.4 G/DL — LOW (ref 12.6–17.4)
HGB BLD-MCNC: 12.1 G/DL — LOW (ref 13–17)
HYALINE CASTS # UR AUTO: 1 /LPF — SIGNIFICANT CHANGE UP (ref 0–2)
INR BLD: 1.09 RATIO — SIGNIFICANT CHANGE UP (ref 0.88–1.16)
KETONES UR-MCNC: NEGATIVE — SIGNIFICANT CHANGE UP
LACTATE BLDV-MCNC: 0.9 MMOL/L — SIGNIFICANT CHANGE UP (ref 0.7–2)
LEUKOCYTE ESTERASE UR-ACNC: NEGATIVE — SIGNIFICANT CHANGE UP
LIDOCAIN IGE QN: 53 U/L — SIGNIFICANT CHANGE UP (ref 7–60)
LYMPHOCYTES # BLD AUTO: 1.97 K/UL — SIGNIFICANT CHANGE UP (ref 1–3.3)
LYMPHOCYTES # BLD AUTO: 38 % — SIGNIFICANT CHANGE UP (ref 13–44)
MANUAL SMEAR VERIFICATION: SIGNIFICANT CHANGE UP
MCHC RBC-ENTMCNC: 20.1 PG — LOW (ref 27–34)
MCHC RBC-ENTMCNC: 31 GM/DL — LOW (ref 32–36)
MCV RBC AUTO: 64.7 FL — LOW (ref 80–100)
MICROCYTES BLD QL: SIGNIFICANT CHANGE UP
MONOCYTES # BLD AUTO: 0.32 K/UL — SIGNIFICANT CHANGE UP (ref 0–0.9)
MONOCYTES NFR BLD AUTO: 6.2 % — SIGNIFICANT CHANGE UP (ref 2–14)
NEUTROPHILS # BLD AUTO: 2.66 K/UL — SIGNIFICANT CHANGE UP (ref 1.8–7.4)
NEUTROPHILS NFR BLD AUTO: 51.3 % — SIGNIFICANT CHANGE UP (ref 43–77)
NITRITE UR-MCNC: NEGATIVE — SIGNIFICANT CHANGE UP
PCO2 BLDV: 46 MMHG — SIGNIFICANT CHANGE UP (ref 42–55)
PH BLDV: 7.36 — SIGNIFICANT CHANGE UP (ref 7.32–7.43)
PH UR: 5.5 — SIGNIFICANT CHANGE UP (ref 5–8)
PLAT MORPH BLD: NORMAL — SIGNIFICANT CHANGE UP
PLATELET # BLD AUTO: 273 K/UL — SIGNIFICANT CHANGE UP (ref 150–400)
PO2 BLDV: 38 MMHG — SIGNIFICANT CHANGE UP (ref 25–45)
POIKILOCYTOSIS BLD QL AUTO: SLIGHT — SIGNIFICANT CHANGE UP
POTASSIUM BLDV-SCNC: 3.7 MMOL/L — SIGNIFICANT CHANGE UP (ref 3.5–5.1)
POTASSIUM SERPL-MCNC: 3.8 MMOL/L — SIGNIFICANT CHANGE UP (ref 3.5–5.3)
POTASSIUM SERPL-SCNC: 3.8 MMOL/L — SIGNIFICANT CHANGE UP (ref 3.5–5.3)
PROT SERPL-MCNC: 6.9 G/DL — SIGNIFICANT CHANGE UP (ref 6–8.3)
PROT UR-MCNC: NEGATIVE — SIGNIFICANT CHANGE UP
PROTHROM AB SERPL-ACNC: 12.6 SEC — SIGNIFICANT CHANGE UP (ref 10.5–13.4)
RBC # BLD: 6.03 M/UL — HIGH (ref 4.2–5.8)
RBC # FLD: 15.3 % — HIGH (ref 10.3–14.5)
RBC BLD AUTO: ABNORMAL
RBC CASTS # UR COMP ASSIST: 0 /HPF — SIGNIFICANT CHANGE UP (ref 0–4)
SAO2 % BLDV: 56.6 % — LOW (ref 67–88)
SARS-COV-2 RNA SPEC QL NAA+PROBE: SIGNIFICANT CHANGE UP
SODIUM SERPL-SCNC: 141 MMOL/L — SIGNIFICANT CHANGE UP (ref 135–145)
SP GR SPEC: 1.02 — SIGNIFICANT CHANGE UP (ref 1.01–1.02)
TARGETS BLD QL SMEAR: SLIGHT — SIGNIFICANT CHANGE UP
UROBILINOGEN FLD QL: NEGATIVE — SIGNIFICANT CHANGE UP
VARIANT LYMPHS # BLD: 0.9 % — SIGNIFICANT CHANGE UP (ref 0–6)
WBC # BLD: 5.19 K/UL — SIGNIFICANT CHANGE UP (ref 3.8–10.5)
WBC # FLD AUTO: 5.19 K/UL — SIGNIFICANT CHANGE UP (ref 3.8–10.5)
WBC UR QL: 0 /HPF — SIGNIFICANT CHANGE UP (ref 0–5)

## 2022-04-22 PROCEDURE — 36415 COLL VENOUS BLD VENIPUNCTURE: CPT

## 2022-04-22 PROCEDURE — 85025 COMPLETE CBC W/AUTO DIFF WBC: CPT

## 2022-04-22 PROCEDURE — 84132 ASSAY OF SERUM POTASSIUM: CPT

## 2022-04-22 PROCEDURE — 78226 HEPATOBILIARY SYSTEM IMAGING: CPT | Mod: MA

## 2022-04-22 PROCEDURE — 81001 URINALYSIS AUTO W/SCOPE: CPT

## 2022-04-22 PROCEDURE — 82962 GLUCOSE BLOOD TEST: CPT

## 2022-04-22 PROCEDURE — 85014 HEMATOCRIT: CPT

## 2022-04-22 PROCEDURE — 84295 ASSAY OF SERUM SODIUM: CPT

## 2022-04-22 PROCEDURE — 99236 HOSP IP/OBS SAME DATE HI 85: CPT

## 2022-04-22 PROCEDURE — 80053 COMPREHEN METABOLIC PANEL: CPT

## 2022-04-22 PROCEDURE — 82803 BLOOD GASES ANY COMBINATION: CPT

## 2022-04-22 PROCEDURE — A9537: CPT

## 2022-04-22 PROCEDURE — 87635 SARS-COV-2 COVID-19 AMP PRB: CPT

## 2022-04-22 PROCEDURE — 83690 ASSAY OF LIPASE: CPT

## 2022-04-22 PROCEDURE — 87086 URINE CULTURE/COLONY COUNT: CPT

## 2022-04-22 PROCEDURE — 85018 HEMOGLOBIN: CPT

## 2022-04-22 PROCEDURE — 78226 HEPATOBILIARY SYSTEM IMAGING: CPT | Mod: 26,MA

## 2022-04-22 PROCEDURE — 83605 ASSAY OF LACTIC ACID: CPT

## 2022-04-22 PROCEDURE — 82435 ASSAY OF BLOOD CHLORIDE: CPT

## 2022-04-22 PROCEDURE — 99284 EMERGENCY DEPT VISIT MOD MDM: CPT | Mod: 25

## 2022-04-22 PROCEDURE — 82330 ASSAY OF CALCIUM: CPT

## 2022-04-22 PROCEDURE — 82947 ASSAY GLUCOSE BLOOD QUANT: CPT

## 2022-04-22 PROCEDURE — 85610 PROTHROMBIN TIME: CPT

## 2022-04-22 PROCEDURE — G0378: CPT

## 2022-04-22 PROCEDURE — 85730 THROMBOPLASTIN TIME PARTIAL: CPT

## 2022-04-22 RX ORDER — SODIUM CHLORIDE 9 MG/ML
1000 INJECTION INTRAMUSCULAR; INTRAVENOUS; SUBCUTANEOUS
Refills: 0 | Status: DISCONTINUED | OUTPATIENT
Start: 2022-04-22 | End: 2022-04-25

## 2022-04-22 RX ORDER — SODIUM CHLORIDE 9 MG/ML
1000 INJECTION, SOLUTION INTRAVENOUS
Refills: 0 | Status: DISCONTINUED | OUTPATIENT
Start: 2022-04-22 | End: 2022-04-25

## 2022-04-22 RX ORDER — KETOROLAC TROMETHAMINE 30 MG/ML
15 SYRINGE (ML) INJECTION ONCE
Refills: 0 | Status: DISCONTINUED | OUTPATIENT
Start: 2022-04-22 | End: 2022-04-22

## 2022-04-22 RX ORDER — DEXTROSE 50 % IN WATER 50 %
12.5 SYRINGE (ML) INTRAVENOUS ONCE
Refills: 0 | Status: DISCONTINUED | OUTPATIENT
Start: 2022-04-22 | End: 2022-04-25

## 2022-04-22 RX ORDER — DEXTROSE 50 % IN WATER 50 %
25 SYRINGE (ML) INTRAVENOUS ONCE
Refills: 0 | Status: DISCONTINUED | OUTPATIENT
Start: 2022-04-22 | End: 2022-04-25

## 2022-04-22 RX ORDER — GLUCAGON INJECTION, SOLUTION 0.5 MG/.1ML
1 INJECTION, SOLUTION SUBCUTANEOUS ONCE
Refills: 0 | Status: DISCONTINUED | OUTPATIENT
Start: 2022-04-22 | End: 2022-04-25

## 2022-04-22 RX ORDER — DEXTROSE 50 % IN WATER 50 %
15 SYRINGE (ML) INTRAVENOUS ONCE
Refills: 0 | Status: DISCONTINUED | OUTPATIENT
Start: 2022-04-22 | End: 2022-04-25

## 2022-04-22 RX ADMIN — SODIUM CHLORIDE 125 MILLILITER(S): 9 INJECTION INTRAMUSCULAR; INTRAVENOUS; SUBCUTANEOUS at 05:56

## 2022-04-22 NOTE — ED PROVIDER NOTE - OBJECTIVE STATEMENT
46 year old male with PMH gout, pre-diabetes presents with abdominal pain x 4 days. Pt reports episode of severe left sided abdominal pain radiating to back 4 days ago, seen in ED 4 days ago with CT and US showing cholelithiasis. Pt states he was seen yesterday by PMD, had outpatient US abdomen showing cholelithiasis/equivocal for cholecystitis, sent to ED for further evaluation. Pt states pain has significantly improved. Denies any fevers, chest pain, shortness of breath, abdominal pain, vomiting, diarrhea, bloody stools, black tarry stools, dysuria, headache, vision change, numbness, weakness, or rash. Denies any additional complaints. 46 year old male with PMH gout, pre-diabetes presents with abdominal pain x 4 days. Pt reports episode of severe left sided abdominal pain radiating to back 4 days ago, seen in ED 4 days ago with CT and US showing cholelithiasis. Pt states he was seen yesterday by PMD, had outpatient US abdomen showing cholelithiasis/equivocal for cholecystitis, sent to ED for further evaluation. Pt states pain has significantly improved. Denies any fevers, chest pain, shortness of breath, abdominal pain, vomiting, diarrhea, bloody stools,  dysuria, headache, vision change, numbness, weakness, or rash. Denies any additional complaints.

## 2022-04-22 NOTE — ED CDU PROVIDER DISPOSITION NOTE - PATIENT PORTAL LINK FT
You can access the FollowMyHealth Patient Portal offered by [facility] by registering at the following website: [email or web address]. By joining [facility]’s FollowMyHealth portal, you will also be able to view your health information using other applications (apps) compatible with our system.

## 2022-04-22 NOTE — ED CDU PROVIDER INITIAL DAY NOTE - WET READ LAUNCH FT
Stable shift. Infant tolerated all feeds, meds and procedures well. V/S stable. NAD noted. See flow sheets for details. Mother and father attentive and appropriate w/ infant during shift. Mother paced bottle feeding infant w/ minimal assistance and encouragement needed. Plan of care reviewed w/ mother; mother states understanding.   Reinforced benefits of skin to skin at birth and throughout hospital stay.  Questions/ Concerns answered, Mother verbalizes understanding.    Formula Feeding Guide given and explained. Handouts included in the guide are as follows: Safe Bottle Feeding, WIC- Let your Baby Set the Pace for Bottle Feeding,  Formula Feeding Record, WISE- Formula Feeding, Managing Non-nursing Engorgement, Community Resources, & Baby Feeding Cues (signs). Instructed to feed on demand/cue, 8 or more times in 24 hours, utilizing paced bottle feeding technique. Feed baby until fullness cues observed. Questions/Concerns answered. Mother verbalized and demonstrated understanding.     There are no Wet Read(s) to document.

## 2022-04-22 NOTE — ED PROVIDER NOTE - CLINICAL SUMMARY MEDICAL DECISION MAKING FREE TEXT BOX
[place]-PGY3: 46 year old male with PMH gout, pre-diabetes presents with abdominal pain x 4 days. Pt reports episode of severe left sided abdominal pain radiating to back 4 days ago, seen in ED 4 days ago with CT and US showing cholelithiasis. Pt states he was seen yesterday by PMD, had outpatient US abdomen showing cholelithiasis/equivocal for cholecystitis, sent to ED for further evaluation. Pt states pain has significantly improved. Abdominal exam benign, nontender. Pt well appearing, declines pain medication at this time. Will obtain labs, surg consult/recs with dispo pending workup.

## 2022-04-22 NOTE — ED CDU PROVIDER DISPOSITION NOTE - CARE PROVIDER_API CALL
Mejia Osei (MD)  Gastroenterology  [address] [place] [address], [address]  [place] [address]  Phone: [phone]  Fax: [phone]  Follow Up Time: 1-3 Days

## 2022-04-22 NOTE — ED CDU PROVIDER INITIAL DAY NOTE - PROGRESS NOTE DETAILS
Pt comfortable. No complaints. Vital signs stable. Will continue to observe and reassess. Awaiting HIDA. -Elizabeth Brown PA-C HIDA negative. Pain free. [facility] surgery resident called, cleared for d/c from surgical standpoint, recommended GI eval but states can be done as outpt. Will PO challenge and attempt to inform sending physician Dr. Mann. Attending aware. - Bg Brown PA-C spoke to NP in Dr. Mann's office, will d/c pt home with outpt follow-up. discussed with Dr. Sloan. -Elizabeth Brown PA-C

## 2022-04-22 NOTE — ED PROVIDER NOTE - ATTENDING CONTRIBUTION TO CARE
I, Nano Jordan, performed a history and physical exam of the patient and discussed their management with the resident and /or advanced care provider. I reviewed the resident and /or ACP's note and agree with the documented findings and plan of care except where otherwise noted in my note. I was present and available for all procedures.     45 yo M pmh gout, prediabetes p/w concern for infection     sent in by Dr. Mann for "infection and IV abx".   Was seen in ER on Sunday diffuse abdominal and back pain, with unremarkable workup   Had abdominal ultrasound today and was called by Dr. Mann to come to ER     Currently no abdominal pain, no n/v, no fevers, no back pain, no urinary symptoms,  Endorses one episode of black stool today.   No lightheadedness, or dizziness.     PHYSICAL EXAM:   General: well-appearing, appears stated age, not in extremis   HEENT: NC/AT,  airway patent  Cardiovascular: regular rate   Respiratory: nonlabored respirations  Abdominal: soft, nontender, nondistended, no rebound, guarding or rigidity  Back: no costovertebral tenderness  Neuro: Alert and oriented x3. Moving all extremities. Ambulatory.  Psychiatric: appropriate mood and affect.   -Nano Jordan MD Attending Physician     Upon chart review, appears had US today with findings concerning for possible acute myke - recommending HIDA as clinically warranted. However, low concern for clinical cholecystitis. H&H stable. No further episodes of melena. Low suspicion for renal colic/pyelo or acute surgical pathology in abdomen at this time. will get screening labs, discuss case with Dr. Mann, reassess for dispo I, Nano Jordan, performed a history and physical exam of the patient and discussed their management with the resident and /or advanced care provider. I reviewed the resident and /or ACP's note and agree with the documented findings and plan of care except where otherwise noted in my note. I was present and available for all procedures.     47 yo M pmh gout, prediabetes p/w concern for infection     sent in by Dr. Mann for concern for cholecystitis  Was seen in ER on Sunday diffuse abdominal and back pain, with US suggesting gallstones without evidence of acute cholecystitis  Had abdominal ultrasound today and was called by Dr. Mann to come to ER for concern for cholecystitis  Currently no abdominal pain, no n/v, no fevers, no back pain, no urinary symptoms,  Endorses one episode of black stool today, no further episodes.   No lightheadedness, or dizziness.     PHYSICAL EXAM:   General: well-appearing, appears stated age, not in extremis   HEENT: NC/AT,  airway patent  Cardiovascular: regular rate   Respiratory: nonlabored respirations  Abdominal: soft, nontender, nondistended, no rebound, guarding or rigidity  Back: no costovertebral tenderness  Neuro: Alert and oriented x3. Moving all extremities. Ambulatory.  Psychiatric: appropriate mood and affect.   -Nano Jordan MD Attending Physician     Upon chart review, appears had US today with findings concerning for possible acute myke - recommending HIDA as clinically warranted. However, low concern for clinical cholecystitis. H&H stable. No further episodes of melena. possible GIB/PUD. Low suspicion for renal colic/pyelo or acute surgical pathology in abdomen at this time. will get screening labs, discuss case with Dr. Mann, reassess for dispo

## 2022-04-22 NOTE — ED ADULT NURSE NOTE - CAS EDN DISCHARGE ASSESSMENT
Alert and oriented to person, place and time/Patient baseline mental status/Awake/Symptoms improved Alert and oriented to person, place and time/Symptoms improved

## 2022-04-22 NOTE — ED CDU PROVIDER INITIAL DAY NOTE - MEDICAL DECISION MAKING DETAILS
47 yo M pmh gout, prediabetes p/w concern for acute cholecystitis (sent in by Dr. Mann)  Was seen in ER on Sunday diffuse abdominal and back pain, with US suggesting gallstones without evidence of acute cholecystitis  Had abdominal ultrasound today and was called by Dr. Mann to come to ER for concern for cholecystitis  Currently no abdominal pain, no n/v, no fevers, no back pain, no urinary symptoms,  Endorses one episode of black stool today, no further episodes.   No lightheadedness, or dizziness.     PHYSICAL EXAM:   General: well-appearing, appears stated age, not in extremis   HEENT: NC/AT,  airway patent  Cardiovascular: regular rate   Respiratory: nonlabored respirations  Abdominal: soft, nontender, nondistended, no rebound, guarding or rigidity  Back: no costovertebral tenderness  Neuro: Alert and oriented x3. Moving all extremities. Ambulatory.  Psychiatric: appropriate mood and affect.   -Nano Jordan MD Attending Physician     sent in for outpatient US concerning for acute cholecystitis. very well appearing now, pain free. Labs nonactionable in ER. discussed with surgery, to place in CDU for HIDA and frequent re-evals.

## 2022-04-22 NOTE — ED ADULT NURSE REASSESSMENT NOTE - NS ED NURSE REASSESS COMMENT FT1
Pt tolerated po. Pt ate crackers, a turkey sandwich and ginger ale sans problems, no n/v. IV D/c'd, no redness/swelling/bleeding. Pt given D/c instructions by VIRGINIA Johnson. D/c amb, A/O x3, NAD.

## 2022-04-22 NOTE — ED ADULT NURSE REASSESSMENT NOTE - NS ED NURSE REASSESS COMMENT FT1
Received pt from ANDRESSA Alonso , received pt alert and responsive, oriented x4, denies any respiratory distress, SOB, or difficulty breathing. Pt transferred to CDU for abdominal pain. Pt is pain free at this time, denies nausea/ vomiting. Received on IVF, tolerating well. IV in place, patent and free of signs of infiltration, V/S stable, pt afebrile. Pt educated on unit and unit rules, instructed patient to notify RN of any needed assistance, Pt verbalizes understanding, Call bell placed within reach. Safety maintained. Will continue to monitor. Pt aware to remain NPO at this time.

## 2022-04-22 NOTE — ED ADULT NURSE REASSESSMENT NOTE - NS ED NURSE REASSESS COMMENT FT1
@0728. Pt A/O x3, NAD, denies pain, awaiting HIDA. @0728. Pt A/O x3, NAD, denies pain, awaiting HIDA.  @8656.

## 2022-04-22 NOTE — ED PROVIDER NOTE - PROGRESS NOTE DETAILS
Nubia-PGY3: pt seen by surgery, low suspicion for cholecystitis, recommending CDU for HIDA. Discussed with CDU PA, pending eval.

## 2022-04-22 NOTE — ED CDU PROVIDER DISPOSITION NOTE - ATTENDING APP SHARED VISIT CONTRIBUTION OF CARE
Attending MD Sloan.  Pt seen and examined by me on morning of 4/22 in CDU obs unit.  Pt is a 47 yo male with pmhx of prediabetes sent to ED by PCP for concern for acute myke.  SEen in ED for diffuse abd'l pain L>R few days ago.  Sent home pain free, sent for sono by Janay after f/u yesterday.  Rep labs/blood work non-actionable.  PT feels well, no current complaints, abdomen soft, non-tender.  Seen by surg and to be followed outpt.  Planned HIDA followed by DC.

## 2022-04-22 NOTE — ED CDU PROVIDER DISPOSITION NOTE - NSFOLLOWUPINSTRUCTIONS_ED_ALL_ED_FT
Hydrate.     We recommend you follow up with your primary care provider within the next 2-3 days, please bring all of your results with you.     Please return to the Emergency Department with new, worsening, or concerning symptoms, such as:  -Worsening abdominal pain  -FEVERS  -Shortness of breath or trouble breathing  -Pressure, pain, tightness in chest  -Facial drooping, arm weakness, or speech difficulty     *More detailed information regarding your visit and discharge can be found by reviewing this packet 1. Rest. Stay hydrated.  2. Continue your current home medications.  3. Follow-up with your medical doctor in 2-3 days.  Bring your results with you for follow-up. Follow up with Gastroenterologist as well, Mejia Osei) [address] [place] [address], [address] [address], Phone: [phone] or Gastroenterology clinic [phone].  4. Return to the ER if you have any worsening symptoms, chest pain, abdominal pain, vomiting, difficulty breathing, fevers, chills, weakness, or any other concerns.

## 2022-04-22 NOTE — ED CDU PROVIDER INITIAL DAY NOTE - NS ED ROS FT
Constitutional: No fever or chills  Eyes: No visual changes, eye pain   CV: No chest pain or lower extremity edema  Resp: No SOB no cough  GI: + abd pain, resolved. No nausea or vomiting. No diarrhea.  : No dysuria, hematuria.   MSK: No musculoskeletal pain  Skin: No rash  Psych: No complaints   Neuro: No headache. No numbness or tingling. No weakness.  Endo: +Pre-DM

## 2022-04-22 NOTE — ED PROVIDER NOTE - PHYSICAL EXAMINATION
CONSTITUTIONAL: non-toxic, well appearing  SKIN: no rash, no petechiae.  EYES: PERRL, EOMI, pink conjunctiva, anicteric  ENT: tongue and uvular midline, no exudates, moist mucous membranes  NECK: Supple; no meningismus, no JVD  CARD: RRR, no murmurs, equal radial pulses bilaterally 2+  RESP: CTAB, no respiratory distress  ABD: Soft, non-tender, non-distended, no peritoneal signs, no CVA tenderness  EXT: Normal ROM x4. No edema.   NEURO: Alert, oriented. Neuro exam nonfocal.  PSYCH: Cooperative, appropriate.

## 2022-04-22 NOTE — ED CDU PROVIDER INITIAL DAY NOTE - DETAILS
Abdominal pain, resolved, concern for cholecystitis  -HIDA  -NPO   -Surgery following  -DW ED team, vitals every 4 hours, frequent reevaluations

## 2022-04-22 NOTE — CONSULT NOTE ADULT - ASSESSMENT
46M with PMHx of pre-DM, gout who presents after being sent in by his PCP to r/o acute cholecystitis. Pt originally had abdominal pain on Sunday and then went out imaging facility to get RUQ US which showed acute myke. Pt then followed up with PCP who looked at RUQ US and sent pt into the ED. On arrival, pt denies any abdominal pain, fever/chills, CP, SOB, vomiting, or other concerns. In the ED, no fever, white count, or elevated LFTs. Surgery called to assess pt.    PLAN:  - Pt was seen, examined and discussed with Dr. Root  - Recommend CDU and HIDA scan  - Dispo to follow    Terrence Huston, PGY-2  [facility]  [id number]

## 2022-04-22 NOTE — ED CDU PROVIDER INITIAL DAY NOTE - PHYSICAL EXAMINATION
GEN: Well Appearing, Nontoxic, NAD  HEENT: NC/AT, Symm Facies. EOMI  CV: No JVD,+S1S2, RRR w/o m/g/r  RESP: CTAB w/o w/r/r  ABD: Soft, nt/nd, +BS. No guarding/rebound.  EXT/MSK: No lower extremity edema or calf tenderness.  FROMx4  SKIN: No visible erythema, lesions or rash  Neuro: Grossly intact, moving all extremities, clear speech   PSYCH: Appropriate mood and affect

## 2022-04-22 NOTE — ED CDU PROVIDER INITIAL DAY NOTE - NS ED ATTENDING STATEMENT MOD
This was a shared visit with the ARNOLDO. I reviewed and verified the documentation and independently performed the documented:

## 2022-04-22 NOTE — ED CDU PROVIDER INITIAL DAY NOTE - OBJECTIVE STATEMENT
46 year old male with PMH gout, pre-diabetes sent from PMD with concern for acute cholecystitis. Pt reports episode of severe left sided abdominal pain radiating to back 4 days ago. Was seen in ED 4 days ago with CT and US showing cholelithiasis. Denies pain since leaving the ED. Pt states he was seen yesterday by PMD, had outpatient US abdomen with concern for an infection. Pt states pain has significantly improved. Denies any fevers, chest pain, shortness of breath, abdominal pain, vomiting, diarrhea, urinary complaints. Denies any additional complaints. 46 year old male with PMH Pre-diabetes sent from PMD with concern for acute cholecystitis. Pt reports episode of severe left sided abdominal pain radiating to back 4 days ago. Was seen in ED 4 days ago with CT and US showing cholelithiasis. Denies pain since leaving the ED. Pt states he was seen yesterday by PMD, had outpatient US abdomen with concern for an infection. Pt states pain has significantly improved. Denies any fevers, chest pain, shortness of breath, abdominal pain, vomiting, diarrhea, urinary complaints. Denies any additional complaints.  ED course: Afebrile. WBC 5.19. Evaluated by surgery team- recommending HIDA. Plan for HIDA in CDU.

## 2022-04-22 NOTE — CONSULT NOTE ADULT - SUBJECTIVE AND OBJECTIVE BOX
Patient Education     omeprazole  Pronunciation:  oh MEP ra zol  Brand:  FIRST Omeprazole, Omeprazole + SyrSpend SF Connie, PriLOSEC, PriLOSEC OTC  What is the most important information I should know about omeprazole? Omeprazole can cause kidney problems. Tell your doctor if you are urinating less than usual, or if you have blood in your urine. Diarrhea may be a sign of a new infection. Call your doctor if you have diarrhea that is watery or has blood in it. Omeprazole may cause new or worsening symptoms of lupus. Tell your doctor if you have joint pain and a skin rash on your cheeks or arms that worsens in sunlight. You may be more likely to have a broken bone while taking this medicine long term or more than once per day. What is omeprazole? Omeprazole is a proton pump inhibitor that decreases the amount of acid produced in the stomach. Omeprazole is used to treat symptoms of gastroesophageal reflux disease (GERD) and other conditions caused by excess stomach acid. Omeprazole is also used to promote healing of erosive esophagitis (damage to your esophagus caused by stomach acid). Omeprazole may also be given together with antibiotics to treat gastric ulcer caused by infection with Helicobacter pylori (H. pylori). Over-the-counter (OTC) omeprazole is used in adults to help control heartburn that occurs 2 or more days per week. This medicine not for immediate relief of heartburn symptoms. OTC omeprazole must be taken on a regular basis for 14 days in a row. Omeprazole may also be used for purposes not listed in this medication guide. What should I discuss with my healthcare provider before taking omeprazole? Heartburn can mimic early symptoms of a heart attack. Get emergency medical help if you have chest pain that spreads to your jaw or shoulder and you feel sweaty or light-headed.   You should not use omeprazole if you are allergic to it, or if:  · you are also allergic to medicines like omeprazole, HPI: 46M with PMHx of pre-DM, gout who presents after being sent in by his PCP to r/o acute cholecystitis. Pt originally had abdominal pain on  and then went out imaging facility to get RUQ US which showed acute myke. Pt then followed up with PCP who looked at RUQ US and sent pt into the ED. On arrival, pt denies any abdominal pain, fever/chills, CP, SOB, vomiting, or other concerns. In the ED, no fever, white count, or elevated LFTs. Surgery called to assess pt.      PAST MEDICAL & SURGICAL HISTORY:  Gout    H/O rotator cuff surgery        REVIEW OF SYSTEMS    10 point review of systems was assessed and is unremarkable other than what was mentioned in the HPI    MEDICATIONS  (STANDING):    MEDICATIONS  (PRN):      Allergies    No Known Allergies    Intolerances    FAMILY HISTORY:    unless noted, no significant family hx with Mother, Father, Siblings    Vital Signs Last 24 Hrs  T(C): 36.8 (:41), Max: 36.8 (:41)  T(F): 98.2 (:41), Max: 98.2 (:41)  HR: 85 (:41) (70 - 85)  BP: 132/77 (:41) (132/77 - 148/95)  BP(mean): --  RR: 17 (:41) (17 - 18)  SpO2: 97% (:41) (97% - 100%)    General: WN/WD NAD  Neurology: Awake, nonfocal, BLUNT x 4  Respiratory: non labored breath sounds on RA  CV: RRR, S1S2, no murmurs, rubs or gallops  Abdominal: Soft, NT, ND  Extremities: No edema, + peripheral pulses  Skin: No Rashes, Hematoma, Ecchymosis  Psych: Oriented x 3, normal affect    LABS:                        12.1   5.19  )-----------( 273      ( 2022 01:17 )             39.0     04-    141  |  105  |  17  ----------------------------<  152<H>  3.8   |  21<L>  |  0.89    Ca    9.0      2022 01:17    TPro  6.9  /  Alb  4.0  /  TBili  0.6  /  DBili  x   /  AST  29  /  ALT  38  /  AlkPhos  59  04-22    PT/INR - ( 2022 01:17 )   PT: 12.6 sec;   INR: 1.09 ratio         PTT - ( 2022 01:17 )  PTT:27.4 sec  Urinalysis Basic - ( 2022 01:54 )    Color: Yellow / Appearance: Clear / S.021 / pH: x  Gluc: x / Ketone: Negative  / Bili: Negative / Urobili: Negative   Blood: x / Protein: Negative / Nitrite: Negative   Leuk Esterase: Negative / RBC: 0 /hpf / WBC 0 /HPF   Sq Epi: x / Non Sq Epi: 0 /hpf / Bacteria: Negative        RADIOLOGY & ADDITIONAL STUDIES: such as esomeprazole, lansoprazole, pantoprazole, rabeprazole, Nexium, Prevacid, Protonix, and others; or  · you also take HIV medication that contains rilpivirine (such as Lilly Lopez). Ask a doctor or pharmacist if this medicine is safe to use if you have:  · trouble or pain with swallowing;  · bloody or black stools, vomit that looks like blood or coffee grounds;  · heartburn that has lasted for over 3 months;  · frequent chest pain, heartburn with wheezing;  · unexplained weight loss;  · nausea or vomiting, stomach pain;  · liver disease;  · low levels of magnesium in your blood; or  · osteoporosis or low bone mineral density (osteopenia). You may be more likely to have a broken bone in your hip, wrist, or spine while taking a proton pump inhibitor long-term or more than once per day. Talk with your doctor about ways to keep your bones healthy. Ask a doctor before using this medicine if you are pregnant or breast-feeding. Do not give this medicine to a child without medical advice. How should I take omeprazole? Follow all directions on your prescription label and read all medication guides or instruction sheets. Use the medicine exactly as directed. Use Prilosec OTC (over-the-counter) exactly as directed on the label, or as prescribed by your doctor. Read and carefully follow any Instructions for Use provided with your medicine. Ask your doctor or pharmacist if you do not understand these instructions. Shake the oral suspension (liquid) before you measure a dose. Use the dosing syringe provided, or use a medicine dose-measuring device (not a kitchen spoon). If you cannot swallow a capsule whole, open it and sprinkle the medicine into a spoonful of applesauce. Swallow the mixture right away without chewing. Do not save it for later use. You must dissolve omeprazole powder in a small amount of water.  This mixture can either be swallowed or given through a nasogastric (NG) sunlight. Taking omeprazole long-term may cause you to develop stomach growths called fundic gland polyps. Talk with your doctor about this risk. If you use omeprazole for longer than 3 years, you could develop a vitamin B-12 deficiency. Talk to your doctor about how to manage this condition if you develop it. Common side effects may include:  · stomach pain, gas;  · nausea, vomiting, diarrhea; or  · headache. This is not a complete list of side effects and others may occur. Call your doctor for medical advice about side effects. You may report side effects to FDA at 0-699-FDA-2351. What other drugs will affect omeprazole? Sometimes it is not safe to use certain medications at the same time. Some drugs can affect your blood levels of other drugs you take, which may increase side effects or make the medications less effective. Tell your doctor about all your current medicines. Many drugs can affect omeprazole, especially:  · clopidogrel;  · methotrexate;  · Hermelinda's wort; or  · an antibiotic --amoxicillin, clarithromycin, rifampin. This list is not complete and many other drugs may affect omeprazole. This includes prescription and over-the-counter medicines, vitamins, and herbal products. Not all possible drug interactions are listed here. Where can I get more information? Your pharmacist can provide more information about omeprazole. Remember, keep this and all other medicines out of the reach of children, never share your medicines with others, and use this medication only for the indication prescribed. Every effort has been made to ensure that the information provided by [address] is accurate, up-to-date, and complete, but no guarantee is made to that effect. Drug information contained herein may be time sensitive.  [facility] information has been compiled for use by healthcare practitioners and consumers in the [place] and therefore [facility] does not warrant that uses outside of the [place] are appropriate, unless specifically indicated otherwise. [employer] drug information does not endorse drugs, diagnose patients or recommend therapy. [employer] drug information is an informational resource designed to assist licensed healthcare practitioners in caring for their patients and/or to serve consumers viewing this service as a supplement to, and not a substitute for, the expertise, skill, knowledge and judgment of healthcare practitioners. The absence of a warning for a given drug or drug combination in no way should be construed to indicate that the drug or drug combination is safe, effective or appropriate for any given patient. [facility] does not assume any responsibility for any aspect of healthcare administered with the aid of information [facility] provides. The information contained herein is not intended to cover all possible uses, directions, precautions, warnings, drug interactions, allergic reactions, or adverse effects. If you have questions about the drugs you are taking, check with your doctor, nurse or pharmacist.  Copyright 0978-4024 [address]: [facility]. Revision date: 4/11/2019. Care instructions adapted under license by [employer]. If you have questions about a medical condition or this instruction, always ask your healthcare professional. [place] [address] any warranty or liability for your use of this information. Patient Education        Hyperventilation: Care Instructions  Your Care Instructions  Hyperventilation is breathing that is deeper and faster than normal. It causes the amount of carbon dioxide (CO2) in the blood to drop. This may make you feel lightheaded. You may also have a fast heartbeat and feel short of breath. It also can lead to numbness or tingling in the hands or feet, anxiety, fainting, and sore chest muscles.   Some causes of sudden hyperventilation include anxiety, asthma, emphysema, a head injury, fever, and some medicines. Hyperventilation also may be caused by a pattern of incorrect breathing. It most often happens when a physical or emotional event makes this breathing pattern worse. Hyperventilation may happen during pregnancy. But it usually goes away on its own after delivery. In many cases, hyperventilation can be controlled by learning proper breathing techniques. Follow-up care is a key part of your treatment and safety. Be sure to make and go to all appointments, and call your doctor if you are having problems. It's also a good idea to know your test results and keep a list of the medicines you take. How can you care for yourself at home? Breathing methods  · Breathe through pursed lips, as if you are whistling. Or pinch one nostril and breathe through your nose. It is harder to hyperventilate through your nose or through pursed lips because you can't move as much air. · Slow your breathing to 1 breath every 5 seconds, or slow enough that symptoms gradually go away. · Try belly-breathing. This fills your lungs fully, slows your breathing rate, and helps you relax. ? Place one hand on your belly just below the ribs. Place the other hand on your chest. You can do this while standing, but it may be more comfortable while you lie on the floor with your knees bent. ? Take a deep breath through your nose. As you breathe in, let your belly push your hand out. Keep your chest still. ? As you breathe out through pursed lips, feel your hand go down. Use the hand on your belly to help you push all the air out. Take your time breathing out. ? Repeat these steps 3 to 10 times. Take your time with each breath. Always try to control your breathing or to belly-breathe first. If these techniques don't work and you don't have other health problems, you might try breathing in and out of a paper bag. Using a paper bag  · Take 6 to 12 easy, natural breaths, with a small paper bag held over your mouth and nose.  Then remove Instructions  Your Care Instructions     A heart-healthy diet has lots of vegetables, fruits, nuts, beans, and whole grains, and is low in salt. It limits foods that are high in saturated fat, such as meats, cheeses, and fried foods. It may be hard to change your diet, but even small changes can lower your risk of heart attack and heart disease. Follow-up care is a key part of your treatment and safety. Be sure to make and go to all appointments, and call your doctor if you are having problems. It's also a good idea to know your test results and keep a list of the medicines you take. How can you care for yourself at home? Watch your portions  · Learn what a serving is. A \"serving\" and a \"portion\" are not always the same thing. Make sure that you are not eating larger portions than are recommended. For example, a serving of pasta is ½ cup. A serving size of meat is 2 to 3 ounces. A 3-ounce serving is about the size of a deck of cards. Measure serving sizes until you are good at [place]" them. Keep in mind that restaurants often serve portions that are 2 or 3 times the size of one serving. · To keep your energy level up and keep you from feeling hungry, eat often but in smaller portions. · Eat only the number of calories you need to stay at a healthy weight. If you need to lose weight, eat fewer calories than your body burns (through exercise and other physical activity). Eat more fruits and vegetables  · Eat a variety of fruit and vegetables every day. Dark green, deep orange, red, or yellow fruits and vegetables are especially good for you. Examples include spinach, carrots, peaches, and berries. · Keep carrots, celery, and other veggies handy for snacks. Buy fruit that is in season and store it where you can see it so that you will be tempted to eat it. · Cook dishes that have a lot of veggies in them, such as stir-fries and soups.   Limit saturated and trans fat  · Read food labels, and try to avoid saturated and trans fats. They increase your risk of heart disease. · Use olive or canola oil when you cook. · Bake, broil, grill, or steam foods instead of frying them. · Choose lean meats instead of high-fat meats such as hot dogs and sausages. Cut off all visible fat when you prepare meat. · Eat fish, skinless poultry, and meat alternatives such as soy products instead of high-fat meats. Soy products, such as tofu, may be especially good for your heart. · Choose low-fat or fat-free milk and dairy products. Eat foods high in fiber  · Eat a variety of grain products every day. Include whole-grain foods that have lots of fiber and nutrients. Examples of whole-grain foods include oats, whole wheat bread, and brown rice. · Buy whole-grain breads and cereals, instead of white bread or pastries. Limit salt and sodium  · Limit how much salt and sodium you eat to help lower your blood pressure. · Taste food before you salt it. Add only a little salt when you think you need it. With time, your taste buds will adjust to less salt. · Eat fewer snack items, fast foods, and other high-salt, processed foods. Check food labels for the amount of sodium in packaged foods. · Choose low-sodium versions of canned goods (such as soups, vegetables, and beans). Limit sugar  · Limit drinks and foods with added sugar. These include candy, desserts, and soda pop. Limit alcohol  · Limit alcohol to no more than 2 drinks a day for men and 1 drink a day for women. Too much alcohol can cause health problems. When should you call for help? Watch closely for changes in your health, and be sure to contact your doctor if:  · You would like help planning heart-healthy meals. Where can you learn more? Go to [email or web address] org and sign in to your [employer]P account. Enter V137 in the [employer] box to learn more about \"Heart-Healthy Diet: Care Instructions. \"     If you do not have an account, please click on the \"Sign Up Now\" link. Current as of: August 22, 2019               Content Version: 12.5  © 0024-2756 [employer]. Care instructions adapted under license by [facility] ([facility]). If you have questions about a medical condition or this instruction, always ask your healthcare professional. [address] any warranty or liability for your use of this information.

## 2022-04-22 NOTE — ED CDU PROVIDER DISPOSITION NOTE - CLINICAL COURSE
46 year old male with PMH Pre-diabetes sent from PMD with concern for acute cholecystitis. Pt reports episode of severe left sided abdominal pain radiating to back 4 days ago. Was seen in ED 4 days ago with CT and US showing cholelithiasis. Denies pain since leaving the ED. Pt states he was seen yesterday by PMD, had outpatient US abdomen with concern for an infection. Pt states pain has significantly improved. Denies any fevers, chest pain, shortness of breath, abdominal pain, vomiting, diarrhea, urinary complaints. Denies any additional complaints.  ED course: Afebrile. WBC 5.19. Evaluated by surgery team- recommending HIDA. Plan for HIDA in CDU. 46 year old male with PMH Pre-diabetes sent from PMD with concern for acute cholecystitis. Pt reports episode of severe left sided abdominal pain radiating to back 4 days ago. Was seen in ED 4 days ago with CT and US showing cholelithiasis. Denies pain since leaving the ED. Pt states he was seen yesterday by PMD, had outpatient US abdomen with concern for an infection. Pt states pain has significantly improved. Denies any fevers, chest pain, shortness of breath, abdominal pain, vomiting, diarrhea, urinary complaints. Denies any additional complaints.  ED course: Afebrile. WBC 5.19. Evaluated by surgery team- recommending HIDA. Plan for HIDA in CDU.  HIDA negative. pt tolerated po intake. discharged home with outpt followup.

## 2022-04-22 NOTE — CONSULT NOTE ADULT - ATTENDING COMMENTS
46yM with T2DM on metformin sent to ED by his cardiologist for abdominal pain and CT/US demonstrating gallstones and possible GB wall thickening concerning for cholecystitis.    Patient reports epigastric abdominal pain 4 days ago, came to ED, workup negative, was sent home feeling well with pain resolved.  Yesterday and today with minimal epigastric and LUQ abdominal pain.    Very comfortable on exam.  Reports no pain at present. No hx of fever, chills, or malaise.  Does report "black stool" yesterday. No prior hx of GI bleeding. Reports he is due for his colonoscopy.    Vitals nl  NAD  Sclerae anicteric  Abd soft, nontender, nondistended, obese    WBC 5  LFTs normal  Hgb 12.1    Impression  R/o cholecystitis  ? GI bleed, hemodynamically stable    Plan  HIDA scan  GI consult for endoscopy/colonoscopy, may be performed as outpatient    Tim Benz MD

## 2022-04-22 NOTE — ED ADULT NURSE NOTE - COVID-19 RESULT DATE/TIME
Leatha Dodson MD - Attending Physician: CT with splenomegaly, new varices, but no results indicative of cause of pain. D/w patient. Is hungry, would like to go home, but still having pain. Will re-medicate, PO chall and re-assess for dispo planning Leatha Dodson MD - Attending Physician: Pt reports pain not controlled on attempt at PO. Morphine not enough. Will give Dilaudid, likely admit for pain control and further management 18-Apr-2022 04:22

## 2022-04-22 NOTE — ED CDU PROVIDER INITIAL DAY NOTE - ATTENDING APP SHARED VISIT CONTRIBUTION OF CARE
I, Nano Jordan, performed a history and physical exam of the patient and discussed their management with the advanced care provider. I reviewed the ACP's note and agree with the documented findings and plan of care except where otherwise noted in my note. I was present and available for all procedures.

## 2022-04-22 NOTE — ED ADULT NURSE NOTE - OBJECTIVE STATEMENT
45 y/o male presenting to the ER c/o abd pain. Pt reports LUQ pain x Sunday, had outpt sonogram performed today after seeing PCP. Pt presents to [facility] A&Ox3, 20 G IV placed in L. AC by ER RN, pt endorsing LUQ pain which is intermittent, no pain at this time, no pain upon palpation, pt states "My PCP called me to come to the ER right away for IV ABX, I feel fine". PMHx gout. Pt denies chest pain, SOB/difficulty breathing, fever/chills, HA, abd pain, N/V/D, lightheadedness/dizziness, numbness/tingling. Pt A&Ox3, breathing spontaneous and unlabored, abd soft, nondistended/nontender, IV locked and patent, pt instructed on use of call bell, bed locked and in lowest position.

## 2022-04-23 LAB
CULTURE RESULTS: NO GROWTH — SIGNIFICANT CHANGE UP
SPECIMEN SOURCE: SIGNIFICANT CHANGE UP

## 2022-05-11 ENCOUNTER — APPOINTMENT (OUTPATIENT)
Dept: GASTROENTEROLOGY | Facility: CLINIC | Age: 47
End: 2022-05-11
Payer: COMMERCIAL

## 2022-05-11 VITALS
SYSTOLIC BLOOD PRESSURE: 122 MMHG | DIASTOLIC BLOOD PRESSURE: 80 MMHG | BODY MASS INDEX: 33.45 KG/M2 | WEIGHT: 269 LBS | HEIGHT: 75 IN

## 2022-05-11 DIAGNOSIS — K76.0 FATTY (CHANGE OF) LIVER, NOT ELSEWHERE CLASSIFIED: ICD-10-CM

## 2022-05-11 PROCEDURE — 99204 OFFICE O/P NEW MOD 45 MIN: CPT

## 2022-05-11 NOTE — HISTORY OF PRESENT ILLNESS
[FreeTextEntry1] : He is a 46 year old male with acute onset of severe diffuse abdominal pain on April 17th. He went to the ER where a sonogram revealed multiple  gallstones and a thickened gallbladder wall consistent with acute cholecystitis.  He was given a course of antibiotics and discharged.  He is feeling fine now  and has no complaints.  He also was referred for  a screening colonoscopy as he is over 45. He denies a family history of colon cancer.  His sonogram also revealed  a fatty liver. He has a history  of diabetes and he is overweight..

## 2022-05-11 NOTE — ASSESSMENT
[FreeTextEntry1] : I referred him to a laparoscopic surgeon for a lap myke\par He should have a screening colonoscopy after renee surgery\par Weight loss]\par Low fat diet

## 2022-05-11 NOTE — PHYSICAL EXAM
[General Appearance - Alert] : alert [General Appearance - In No Acute Distress] : in no acute distress [Sclera] : the sclera and conjunctiva were normal [PERRL With Normal Accommodation] : pupils were equal in size, round, and reactive to light [Extraocular Movements] : extraocular movements were intact [Outer Ear] : the ears and nose were normal in appearance [Oropharynx] : the oropharynx was normal [Neck Appearance] : the appearance of the neck was normal [Neck Cervical Mass (___cm)] : no neck mass was observed [Jugular Venous Distention Increased] : there was no jugular-venous distention [Thyroid Diffuse Enlargement] : the thyroid was not enlarged [Thyroid Nodule] : there were no palpable thyroid nodules [Auscultation Breath Sounds / Voice Sounds] : lungs were clear to auscultation bilaterally [Heart Rate And Rhythm] : heart rate was normal and rhythm regular [Heart Sounds] : normal S1 and S2 [Heart Sounds Gallop] : no gallops [Murmurs] : no murmurs [Bowel Sounds] : normal bowel sounds [Heart Sounds Pericardial Friction Rub] : no pericardial rub [Abdomen Soft] : soft [Abdomen Tenderness] : non-tender [] : no hepato-splenomegaly [Abdomen Mass (___ Cm)] : no abdominal mass palpated

## 2022-05-11 NOTE — CONSULT LETTER
[Dear  ___] : Dear  [unfilled], [Consult Letter:] : I had the pleasure of evaluating your patient, [unfilled]. [( Thank you for referring [unfilled] for consultation for _____ )] : Thank you for referring [unfilled] for consultation for [unfilled] [Please see my note below.] : Please see my note below. [Sincerely,] : Sincerely, [FreeTextEntry3] : Don\par \par Darrell Fishman MD\par \par Gastroenterology\par [facility]\par [facility]\par \par

## 2022-05-13 ENCOUNTER — APPOINTMENT (OUTPATIENT)
Dept: UROLOGY | Facility: CLINIC | Age: 47
End: 2022-05-13

## 2022-05-25 ENCOUNTER — APPOINTMENT (OUTPATIENT)
Dept: OTHER | Facility: CLINIC | Age: 47
End: 2022-05-25

## 2022-05-25 DIAGNOSIS — K80.50 CALCULUS OF BILE DUCT W/OUT CHOLANGITIS OR CHOLECYSTITIS W/OUT OBSTRUCTION: ICD-10-CM

## 2022-05-25 PROBLEM — K76.0 FATTY LIVER: Status: ACTIVE | Noted: 2022-05-11

## 2022-05-25 PROCEDURE — 99396 PREV VISIT EST AGE 40-64: CPT | Mod: 95

## 2022-05-25 NOTE — DISCUSSION/SUMMARY
[Home] : at home, [unfilled] , at the time of the visit. [Other Location: e.g. Home (Enter Location, City,State)___] : at [unfilled] [Verbal consent obtained from patient] : the patient, [unfilled] [Patient seen for WTC Monitoring ___] : Patient was seen for WTC monitoring [unfilled] [Please See Note in Chart and Documentation in Trial DB] : Please see note in chart and documentation in Trial DB. 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 He states he is feeling good \par  had bout of a myke 1 month ago \par has GB stones \par  has to schedule cholecystectomy \par \par \par  \par Active Problems\par Gout \par Osteoarthritis of foot joint \par Osteophyte of right foot \par Diabetes mellitus \par \par Surgical History\par History of Shoulder Surgery\par \par Family History\par Family history of diabetes mellitus (V18.0) (Z83.3) : Mother\par Family history of gout (V18.19) (Z82.69) : Mother, Father\par \par Social History\par Never a smoker\par No alcohol use\par No drug use\par \par \par Allergies\par No Known Drug Allergies\par \par Review of Systems: reviewed in Trial DB \par \par \par \par \par \par  \par Assessment and Plan\par \par [facility] annual MV \par pt due for screening colonoscopy for colon ca screening\par was advise to do after cholecystectomy by his GI \par advised to [facility] for the auth  once ready to schedule colonoscopy

## 2022-07-06 ENCOUNTER — APPOINTMENT (OUTPATIENT)
Dept: CARDIOLOGY | Facility: CLINIC | Age: 47
End: 2022-07-06

## 2022-07-06 VITALS
HEART RATE: 93 BPM | DIASTOLIC BLOOD PRESSURE: 80 MMHG | OXYGEN SATURATION: 97 % | TEMPERATURE: 98.3 F | SYSTOLIC BLOOD PRESSURE: 140 MMHG

## 2022-07-06 DIAGNOSIS — R10.9 UNSPECIFIED ABDOMINAL PAIN: ICD-10-CM

## 2022-07-06 PROCEDURE — 99213 OFFICE O/P EST LOW 20 MIN: CPT

## 2022-07-06 NOTE — PHYSICAL EXAM
[Well Developed] : well developed [Well Nourished] : well nourished [No Acute Distress] : no acute distress [Normal Conjunctiva] : normal conjunctiva [Normal Venous Pressure] : normal venous pressure [No Carotid Bruit] : no carotid bruit [Normal S1, S2] : normal S1, S2 [No Murmur] : no murmur [No Rub] : no rub [No Gallop] : no gallop [Clear Lung Fields] : clear lung fields [Good Air Entry] : good air entry [No Respiratory Distress] : no respiratory distress  [Soft] : abdomen soft none [Non Tender] : non-tender [No Masses/organomegaly] : no masses/organomegaly [Normal Bowel Sounds] : normal bowel sounds [Normal Gait] : normal gait [No Edema] : no edema [No Cyanosis] : no cyanosis [No Clubbing] : no clubbing [No Varicosities] : no varicosities [No Rash] : no rash [No Skin Lesions] : no skin lesions [Moves all extremities] : moves all extremities [No Focal Deficits] : no focal deficits [Normal Speech] : normal speech [Alert and Oriented] : alert and oriented [Normal memory] : normal memory

## 2022-07-06 NOTE — HISTORY OF PRESENT ILLNESS
[FreeTextEntry1] : This is a 45 y/o male with a PMhx of HTN here today for follow up. Pt had history of cholelithiasis and acute cholecystitis and was referred by GI for a laparoscopic cholecystectomy. Pt has been feleing well, reports that abdominal pain has been controlled. Pt has appointment with Dr. Tim Benz on 8/1/22 for consult.\par Pt denies chest pain, dyspnea, palpitations or dizziness. \par

## 2022-08-01 ENCOUNTER — APPOINTMENT (OUTPATIENT)
Dept: SURGERY | Facility: CLINIC | Age: 47
End: 2022-08-01

## 2022-08-01 VITALS
BODY MASS INDEX: 34.65 KG/M2 | RESPIRATION RATE: 18 BRPM | WEIGHT: 270 LBS | HEART RATE: 83 BPM | HEIGHT: 74 IN | DIASTOLIC BLOOD PRESSURE: 90 MMHG | SYSTOLIC BLOOD PRESSURE: 138 MMHG | OXYGEN SATURATION: 97 % | TEMPERATURE: 97.8 F

## 2022-08-01 PROCEDURE — 99214 OFFICE O/P EST MOD 30 MIN: CPT

## 2022-08-01 NOTE — PLAN
[FreeTextEntry1] : Laparoscopic cholecystectomy to be scheduled\par Preoperative medical clearance requested

## 2022-08-01 NOTE — CONSULT LETTER
[Dear  ___] : Dear  [unfilled], [Consult Letter:] : I had the pleasure of evaluating your patient, [unfilled]. [Please see my note below.] : Please see my note below. [Consult Closing:] : Thank you very much for allowing me to participate in the care of this patient.  If you have any questions, please do not hesitate to contact me. [Sincerely,] : Sincerely, [FreeTextEntry2] : Dr. Mann [FreeTextEntry3] : Tim Benz MD, FACS, FASMBS\par [facility]\par [address]\[place] [address]\par tel. [phone]\par fax [phone]\par  \par   [DrClifton  ___] : Dr. DOMINGO

## 2022-08-01 NOTE — PHYSICAL EXAM
[No Rash or Lesion] : No rash or lesion [Calm] : calm [de-identified] : Well-appearing, no acute distress [de-identified] : Sclerae anicteric [de-identified] : Normal respirations [de-identified] : Soft, nontender, nondistended.  No masses.

## 2022-08-01 NOTE — HISTORY OF PRESENT ILLNESS
[de-identified] : LACEY  is a 47 year  male here for a consultation for cholecystitis.\par \par The patient was referred by his GI, Dr. Santoyo, and his primary, Dr. Mann, for evaluation for cholecystectomy.  He has a history of gallstones and an episode of severe postprandial epigastric pain radiating to his back in April 2022.  Ultrasound performed at that time demonstrated Mildly distended thick-walled gallbladder containing gallstones.  Also seen was fatty liver.  Bile ducts were normal caliber.\par \par The patient was treated with antibiotics and symptoms resolved.  He has had intermittent vague epigastric discomfort, but no acute pain since that time.  He reports no changes in his bowel function.  He reports no fevers, chills, or malaise.  Normal appetite.\par \par Recent blood work done in July demonstrates normal LFTs.\par \par Past medical history notable for gout and prediabetes.\par Past surgical history notable for shoulder surgery.  He has had no prior abdominal surgery.\par

## 2022-08-01 NOTE — ASSESSMENT
[FreeTextEntry1] : 47-year-old male with symptomatic cholelithiasis and history of acute cholecystitis.\par \par I have recommended laparoscopic cholecystectomy.\par \par Risks, benefits, alternatives discussed at length with patient. This included discussion of laparoscopic approach, risk of bleeding, small risk of injury to biliary ductal system, and possibility of conversion to open procedure. The patient expressed understanding and wishes to proceed

## 2022-08-23 ENCOUNTER — RX RENEWAL (OUTPATIENT)
Age: 47
End: 2022-08-23

## 2022-08-23 ENCOUNTER — APPOINTMENT (OUTPATIENT)
Dept: CARDIOLOGY | Facility: CLINIC | Age: 47
End: 2022-08-23

## 2022-08-23 ENCOUNTER — LABORATORY RESULT (OUTPATIENT)
Age: 47
End: 2022-08-23

## 2022-08-23 VITALS
OXYGEN SATURATION: 98 % | TEMPERATURE: 97.9 F | BODY MASS INDEX: 34.65 KG/M2 | DIASTOLIC BLOOD PRESSURE: 90 MMHG | HEIGHT: 74 IN | SYSTOLIC BLOOD PRESSURE: 130 MMHG | WEIGHT: 270 LBS | HEART RATE: 85 BPM

## 2022-08-23 DIAGNOSIS — M10.9 GOUT, UNSPECIFIED: ICD-10-CM

## 2022-08-23 PROCEDURE — 99214 OFFICE O/P EST MOD 30 MIN: CPT

## 2022-08-23 RX ORDER — OMEGA-3/DHA/EPA/FISH OIL 300-1000MG
1000 CAPSULE ORAL
Qty: 180 | Refills: 0 | Status: ACTIVE | COMMUNITY
Start: 2022-08-23 | End: 1900-01-01

## 2022-08-23 NOTE — HISTORY OF PRESENT ILLNESS
[FreeTextEntry1] : This is a 46 y/o male with a PMhx of DM HLD cholelithiasis/ acute cholecystitis HTN who presents to office for f/u pt last seen on 7/6/22  \par pt reports feeling well denies any complaints\par Denies any abdominal pn n/v/d denies any CP or SOb\par for lap myke oct 13

## 2022-09-13 ENCOUNTER — NON-APPOINTMENT (OUTPATIENT)
Age: 47
End: 2022-09-13

## 2022-09-13 ENCOUNTER — APPOINTMENT (OUTPATIENT)
Dept: CARDIOLOGY | Facility: CLINIC | Age: 47
End: 2022-09-13

## 2022-09-13 VITALS
HEIGHT: 74 IN | DIASTOLIC BLOOD PRESSURE: 70 MMHG | TEMPERATURE: 97.6 F | HEART RATE: 91 BPM | WEIGHT: 270 LBS | OXYGEN SATURATION: 91 % | SYSTOLIC BLOOD PRESSURE: 102 MMHG | BODY MASS INDEX: 34.65 KG/M2

## 2022-09-13 DIAGNOSIS — E78.2 MIXED HYPERLIPIDEMIA: ICD-10-CM

## 2022-09-13 DIAGNOSIS — Z87.19 PERSONAL HISTORY OF OTHER DISEASES OF THE DIGESTIVE SYSTEM: ICD-10-CM

## 2022-09-13 DIAGNOSIS — K80.20 CALCULUS OF GALLBLADDER W/OUT CHOLECYSTITIS W/OUT OBSTRUCTION: ICD-10-CM

## 2022-09-13 DIAGNOSIS — R94.31 ABNORMAL ELECTROCARDIOGRAM [ECG] [EKG]: ICD-10-CM

## 2022-09-13 PROCEDURE — 93000 ELECTROCARDIOGRAM COMPLETE: CPT

## 2022-09-13 PROCEDURE — 99214 OFFICE O/P EST MOD 30 MIN: CPT | Mod: 25

## 2022-09-13 NOTE — HISTORY OF PRESENT ILLNESS
[FreeTextEntry1] : This is a 48 y/o male with a PMhx of DM HLD cholelithiasis/ acute cholecystitis HTN who presents to office for f/u \par  pt reports tolerating statin, fish oil and losartan well Denies any complaints Pilo any CP SOB dizziness or myalgias

## 2022-09-17 ENCOUNTER — APPOINTMENT (OUTPATIENT)
Dept: CARDIOLOGY | Facility: CLINIC | Age: 47
End: 2022-09-17

## 2022-09-17 PROCEDURE — 93306 TTE W/DOPPLER COMPLETE: CPT

## 2022-09-19 ENCOUNTER — APPOINTMENT (OUTPATIENT)
Dept: CARDIOLOGY | Facility: CLINIC | Age: 47
End: 2022-09-19

## 2022-09-19 PROCEDURE — 93015 CV STRESS TEST SUPVJ I&R: CPT

## 2022-09-22 ENCOUNTER — OUTPATIENT (OUTPATIENT)
Dept: OUTPATIENT SERVICES | Facility: HOSPITAL | Age: 47
LOS: 1 days | End: 2022-09-22
Payer: COMMERCIAL

## 2022-09-22 VITALS
RESPIRATION RATE: 16 BRPM | OXYGEN SATURATION: 98 % | WEIGHT: 270.07 LBS | HEIGHT: 74 IN | HEART RATE: 78 BPM | SYSTOLIC BLOOD PRESSURE: 127 MMHG | DIASTOLIC BLOOD PRESSURE: 88 MMHG | TEMPERATURE: 99 F

## 2022-09-22 DIAGNOSIS — K80.20 CALCULUS OF GALLBLADDER WITHOUT CHOLECYSTITIS WITHOUT OBSTRUCTION: ICD-10-CM

## 2022-09-22 DIAGNOSIS — Z01.818 ENCOUNTER FOR OTHER PREPROCEDURAL EXAMINATION: ICD-10-CM

## 2022-09-22 DIAGNOSIS — E11.9 TYPE 2 DIABETES MELLITUS WITHOUT COMPLICATIONS: ICD-10-CM

## 2022-09-22 DIAGNOSIS — Z87.19 PERSONAL HISTORY OF OTHER DISEASES OF THE DIGESTIVE SYSTEM: ICD-10-CM

## 2022-09-22 DIAGNOSIS — Z98.89 OTHER SPECIFIED POSTPROCEDURAL STATES: Chronic | ICD-10-CM

## 2022-09-22 DIAGNOSIS — I10 ESSENTIAL (PRIMARY) HYPERTENSION: ICD-10-CM

## 2022-09-22 LAB
ALBUMIN SERPL ELPH-MCNC: 4.6 G/DL — SIGNIFICANT CHANGE UP (ref 3.3–5)
ALP SERPL-CCNC: 72 U/L — SIGNIFICANT CHANGE UP (ref 40–120)
ALT FLD-CCNC: 30 U/L — SIGNIFICANT CHANGE UP (ref 10–45)
ANION GAP SERPL CALC-SCNC: 16 MMOL/L — SIGNIFICANT CHANGE UP (ref 5–17)
AST SERPL-CCNC: 21 U/L — SIGNIFICANT CHANGE UP (ref 10–40)
BILIRUB SERPL-MCNC: 0.8 MG/DL — SIGNIFICANT CHANGE UP (ref 0.2–1.2)
BUN SERPL-MCNC: 18 MG/DL — SIGNIFICANT CHANGE UP (ref 7–23)
CALCIUM SERPL-MCNC: 9.3 MG/DL — SIGNIFICANT CHANGE UP (ref 8.4–10.5)
CHLORIDE SERPL-SCNC: 104 MMOL/L — SIGNIFICANT CHANGE UP (ref 96–108)
CO2 SERPL-SCNC: 20 MMOL/L — LOW (ref 22–31)
CREAT SERPL-MCNC: 0.94 MG/DL — SIGNIFICANT CHANGE UP (ref 0.5–1.3)
EGFR: 99 ML/MIN/1.73M2 — SIGNIFICANT CHANGE UP
GLUCOSE SERPL-MCNC: 142 MG/DL — HIGH (ref 70–99)
HCT VFR BLD CALC: 40.8 % — SIGNIFICANT CHANGE UP (ref 39–50)
HGB BLD-MCNC: 12.6 G/DL — LOW (ref 13–17)
MCHC RBC-ENTMCNC: 20 PG — LOW (ref 27–34)
MCHC RBC-ENTMCNC: 30.9 GM/DL — LOW (ref 32–36)
MCV RBC AUTO: 64.8 FL — LOW (ref 80–100)
NRBC # BLD: 0 /100 WBCS — SIGNIFICANT CHANGE UP (ref 0–0)
PLATELET # BLD AUTO: 245 K/UL — SIGNIFICANT CHANGE UP (ref 150–400)
POTASSIUM SERPL-MCNC: 4 MMOL/L — SIGNIFICANT CHANGE UP (ref 3.5–5.3)
POTASSIUM SERPL-SCNC: 4 MMOL/L — SIGNIFICANT CHANGE UP (ref 3.5–5.3)
PROT SERPL-MCNC: 7.4 G/DL — SIGNIFICANT CHANGE UP (ref 6–8.3)
RBC # BLD: 6.3 M/UL — HIGH (ref 4.2–5.8)
RBC # FLD: 16.5 % — HIGH (ref 10.3–14.5)
SODIUM SERPL-SCNC: 140 MMOL/L — SIGNIFICANT CHANGE UP (ref 135–145)
WBC # BLD: 4.57 K/UL — SIGNIFICANT CHANGE UP (ref 3.8–10.5)
WBC # FLD AUTO: 4.57 K/UL — SIGNIFICANT CHANGE UP (ref 3.8–10.5)

## 2022-09-22 PROCEDURE — G0463: CPT

## 2022-09-22 PROCEDURE — 80053 COMPREHEN METABOLIC PANEL: CPT

## 2022-09-22 PROCEDURE — 83036 HEMOGLOBIN GLYCOSYLATED A1C: CPT

## 2022-09-22 PROCEDURE — 36415 COLL VENOUS BLD VENIPUNCTURE: CPT

## 2022-09-22 PROCEDURE — 85027 COMPLETE CBC AUTOMATED: CPT

## 2022-09-22 RX ORDER — SODIUM CHLORIDE 9 MG/ML
1000 INJECTION, SOLUTION INTRAVENOUS
Refills: 0 | Status: DISCONTINUED | OUTPATIENT
Start: 2022-10-13 | End: 2022-10-27

## 2022-09-22 RX ORDER — SODIUM CHLORIDE 9 MG/ML
3 INJECTION INTRAMUSCULAR; INTRAVENOUS; SUBCUTANEOUS EVERY 8 HOURS
Refills: 0 | Status: DISCONTINUED | OUTPATIENT
Start: 2022-10-13 | End: 2022-10-27

## 2022-09-22 RX ORDER — GLIMEPIRIDE 1 MG
0 TABLET ORAL
Qty: 0 | Refills: 0 | DISCHARGE

## 2022-09-22 RX ORDER — METFORMIN HYDROCHLORIDE 850 MG/1
0 TABLET ORAL
Qty: 0 | Refills: 0 | DISCHARGE

## 2022-09-22 NOTE — H&P PST ADULT - PROBLEM SELECTOR PLAN 1
Laparoscopic cholecystectomy  Possible cholangiogram  Labs- CBC, CMP, Hb A1C  Pre op instructions discussed

## 2022-09-22 NOTE — H&P PST ADULT - NSICDXFAMILYHX_GEN_ALL_CORE_FT
FAMILY HISTORY:  Father  Still living? Unknown  FH: HTN (hypertension), Age at diagnosis: Age Unknown  FH: type 2 diabetes, Age at diagnosis: Age Unknown    Mother  Still living? Yes, Estimated age: Age Unknown  FH: HTN (hypertension), Age at diagnosis: Age Unknown  FH: type 2 diabetes, Age at diagnosis: Age Unknown

## 2022-09-22 NOTE — H&P PST ADULT - HISTORY OF PRESENT ILLNESS
48 yo M with h/o HTN, DM2, HLD was admitted to ED with acute abdominal pain on 4/21/22. CT abdomen/pelvis/ MRI revealed gall bladder calculus. Pt had surgical consult- scheduled for laparoscopic cholecystectomy, possible cholangiogram on 10/13/22  **Pt denies any fever, chills, abdominal pain, N/V or sick contacts  **Covid 19 PCR on 10/10/22

## 2022-09-22 NOTE — H&P PST ADULT - NSICDXPASTMEDICALHX_GEN_ALL_CORE_FT
PAST MEDICAL HISTORY:  Benign essential HTN     COVID-19 virus infection 12/2021 with mild symptoms    Gall bladder stones     Gout     History of thalassemia     Nonalcoholic fatty liver disease     Type 2 diabetes mellitus     Vitamin D deficiency

## 2022-09-23 ENCOUNTER — NON-APPOINTMENT (OUTPATIENT)
Age: 47
End: 2022-09-23

## 2022-09-23 PROBLEM — E11.9 TYPE 2 DIABETES MELLITUS WITHOUT COMPLICATIONS: Chronic | Status: ACTIVE | Noted: 2022-09-22

## 2022-09-23 LAB
A1C WITH ESTIMATED AVERAGE GLUCOSE RESULT: 8.7 % — HIGH (ref 4–5.6)
ESTIMATED AVERAGE GLUCOSE: 203 MG/DL — HIGH (ref 68–114)

## 2022-09-26 PROBLEM — U07.1 COVID-19: Chronic | Status: ACTIVE | Noted: 2022-09-22

## 2022-09-26 PROBLEM — K80.20 CALCULUS OF GALLBLADDER WITHOUT CHOLECYSTITIS WITHOUT OBSTRUCTION: Chronic | Status: ACTIVE | Noted: 2022-09-22

## 2022-09-26 PROBLEM — E55.9 VITAMIN D DEFICIENCY, UNSPECIFIED: Chronic | Status: ACTIVE | Noted: 2022-09-22

## 2022-09-26 PROBLEM — K76.0 FATTY (CHANGE OF) LIVER, NOT ELSEWHERE CLASSIFIED: Chronic | Status: ACTIVE | Noted: 2022-09-22

## 2022-09-26 PROBLEM — Z86.2 PERSONAL HISTORY OF DISEASES OF THE BLOOD AND BLOOD-FORMING ORGANS AND CERTAIN DISORDERS INVOLVING THE IMMUNE MECHANISM: Chronic | Status: ACTIVE | Noted: 2022-09-22

## 2022-09-26 PROBLEM — I10 ESSENTIAL (PRIMARY) HYPERTENSION: Chronic | Status: ACTIVE | Noted: 2022-09-22

## 2022-09-30 ENCOUNTER — APPOINTMENT (OUTPATIENT)
Dept: ENDOCRINOLOGY | Facility: CLINIC | Age: 47
End: 2022-09-30

## 2022-09-30 VITALS
DIASTOLIC BLOOD PRESSURE: 82 MMHG | WEIGHT: 268.19 LBS | SYSTOLIC BLOOD PRESSURE: 122 MMHG | OXYGEN SATURATION: 98 % | BODY MASS INDEX: 34.42 KG/M2 | TEMPERATURE: 98.2 F | HEIGHT: 74 IN | HEART RATE: 74 BPM

## 2022-09-30 LAB
GLUCOSE BLDC GLUCOMTR-MCNC: 95
HBA1C MFR BLD HPLC: 8.1

## 2022-09-30 PROCEDURE — 95250 CONT GLUC MNTR PHYS/QHP EQP: CPT

## 2022-09-30 PROCEDURE — 83036 HEMOGLOBIN GLYCOSYLATED A1C: CPT | Mod: QW

## 2022-09-30 NOTE — HISTORY OF PRESENT ILLNESS
[FreeTextEntry1] : Mr. LACEY PIERCE is a 46 year old male who returns with regard to a hx of type 2 DM. The diabetes has been present for about under 2 years. He denies any history of retinopathy, neuropathy or nephropathy. \par \par He does take Metformin Er 500 mg BID and glimepiride 2mg once daily. He denies polyuria, polydipsia, or any visual changes. He too denies any skin lesions, skin breakdown or non-healing areas of skin. He too denies any podiatric concerns. Ophthalmologic evaluation is due--not done for some time.  He does deny any hypoglycemia or hypoglycemic signs or symptoms. \par \par He is testing BG about once a week. BG based on HGM seems to be running in the 130s in the AM. \par POCT A1c returned today at 8.1% ; previously 7.7% on 7/6/22\par POCT glucose returned today at 95 mg/dL \par \par He is pending cholecystectomy with Dr. Benz on 10/13/22. \par \par Additional medical history includes that of gout\par Medications - colchicine as needed - no over the counter supplements \par Operates heavy equipment\par States his weight fluctuates\par \par Covid Infection - 12/2021 not hospitalized; no residual symptoms \par Denies any recent hospitalizations \par

## 2022-10-10 ENCOUNTER — OUTPATIENT (OUTPATIENT)
Dept: OUTPATIENT SERVICES | Facility: HOSPITAL | Age: 47
LOS: 1 days | End: 2022-10-10
Payer: COMMERCIAL

## 2022-10-10 DIAGNOSIS — Z98.89 OTHER SPECIFIED POSTPROCEDURAL STATES: Chronic | ICD-10-CM

## 2022-10-10 DIAGNOSIS — Z11.52 ENCOUNTER FOR SCREENING FOR COVID-19: ICD-10-CM

## 2022-10-10 LAB — SARS-COV-2 RNA SPEC QL NAA+PROBE: SIGNIFICANT CHANGE UP

## 2022-10-10 PROCEDURE — C9803: CPT

## 2022-10-10 PROCEDURE — U0005: CPT

## 2022-10-10 PROCEDURE — U0003: CPT

## 2022-10-12 ENCOUNTER — TRANSCRIPTION ENCOUNTER (OUTPATIENT)
Age: 47
End: 2022-10-12

## 2022-10-13 ENCOUNTER — RESULT REVIEW (OUTPATIENT)
Age: 47
End: 2022-10-13

## 2022-10-13 ENCOUNTER — APPOINTMENT (OUTPATIENT)
Dept: SURGERY | Facility: HOSPITAL | Age: 47
End: 2022-10-13

## 2022-10-13 ENCOUNTER — TRANSCRIPTION ENCOUNTER (OUTPATIENT)
Age: 47
End: 2022-10-13

## 2022-10-13 ENCOUNTER — OUTPATIENT (OUTPATIENT)
Dept: OUTPATIENT SERVICES | Facility: HOSPITAL | Age: 47
LOS: 1 days | End: 2022-10-13
Payer: COMMERCIAL

## 2022-10-13 VITALS
OXYGEN SATURATION: 94 % | TEMPERATURE: 98 F | DIASTOLIC BLOOD PRESSURE: 64 MMHG | RESPIRATION RATE: 17 BRPM | SYSTOLIC BLOOD PRESSURE: 111 MMHG | HEART RATE: 84 BPM

## 2022-10-13 VITALS
HEART RATE: 71 BPM | DIASTOLIC BLOOD PRESSURE: 84 MMHG | HEIGHT: 74 IN | SYSTOLIC BLOOD PRESSURE: 128 MMHG | WEIGHT: 270.07 LBS | OXYGEN SATURATION: 100 % | RESPIRATION RATE: 16 BRPM | TEMPERATURE: 98 F

## 2022-10-13 DIAGNOSIS — Z87.19 PERSONAL HISTORY OF OTHER DISEASES OF THE DIGESTIVE SYSTEM: ICD-10-CM

## 2022-10-13 DIAGNOSIS — K80.20 CALCULUS OF GALLBLADDER WITHOUT CHOLECYSTITIS WITHOUT OBSTRUCTION: ICD-10-CM

## 2022-10-13 DIAGNOSIS — Z98.89 OTHER SPECIFIED POSTPROCEDURAL STATES: Chronic | ICD-10-CM

## 2022-10-13 LAB — GLUCOSE BLDC GLUCOMTR-MCNC: 128 MG/DL — HIGH (ref 70–99)

## 2022-10-13 PROCEDURE — 88304 TISSUE EXAM BY PATHOLOGIST: CPT | Mod: 26

## 2022-10-13 PROCEDURE — C1889: CPT

## 2022-10-13 PROCEDURE — 47562 LAPAROSCOPIC CHOLECYSTECTOMY: CPT

## 2022-10-13 PROCEDURE — 82962 GLUCOSE BLOOD TEST: CPT

## 2022-10-13 PROCEDURE — 88304 TISSUE EXAM BY PATHOLOGIST: CPT

## 2022-10-13 PROCEDURE — C9399: CPT

## 2022-10-13 DEVICE — SURGIFLO MATRIX WITH THROMBIN KIT: Type: IMPLANTABLE DEVICE | Status: FUNCTIONAL

## 2022-10-13 DEVICE — ARISTA 3GR: Type: IMPLANTABLE DEVICE | Status: FUNCTIONAL

## 2022-10-13 DEVICE — CLIP APPLIER COVIDIEN ENDOCLIP III 5MM: Type: IMPLANTABLE DEVICE | Status: FUNCTIONAL

## 2022-10-13 RX ORDER — CHLORHEXIDINE GLUCONATE 213 G/1000ML
1 SOLUTION TOPICAL ONCE
Refills: 0 | Status: COMPLETED | OUTPATIENT
Start: 2022-10-13 | End: 2022-10-13

## 2022-10-13 RX ORDER — FENTANYL CITRATE 50 UG/ML
25 INJECTION INTRAVENOUS
Refills: 0 | Status: DISCONTINUED | OUTPATIENT
Start: 2022-10-13 | End: 2022-10-13

## 2022-10-13 RX ORDER — OXYCODONE HYDROCHLORIDE 5 MG/1
5 TABLET ORAL ONCE
Refills: 0 | Status: DISCONTINUED | OUTPATIENT
Start: 2022-10-13 | End: 2022-10-13

## 2022-10-13 RX ORDER — GLIMEPIRIDE 1 MG
1 TABLET ORAL
Qty: 0 | Refills: 0 | DISCHARGE

## 2022-10-13 RX ORDER — OXYCODONE HYDROCHLORIDE 5 MG/1
1 TABLET ORAL
Qty: 12 | Refills: 0
Start: 2022-10-13 | End: 2022-10-15

## 2022-10-13 RX ORDER — LIDOCAINE HCL 20 MG/ML
0.2 VIAL (ML) INJECTION ONCE
Refills: 0 | Status: COMPLETED | OUTPATIENT
Start: 2022-10-13 | End: 2022-10-13

## 2022-10-13 RX ORDER — OMEGA-3 ACID ETHYL ESTERS 1 G
1 CAPSULE ORAL
Qty: 0 | Refills: 0 | DISCHARGE

## 2022-10-13 RX ORDER — METFORMIN HYDROCHLORIDE 850 MG/1
1 TABLET ORAL
Qty: 0 | Refills: 0 | DISCHARGE

## 2022-10-13 RX ORDER — CHOLECALCIFEROL (VITAMIN D3) 125 MCG
1 CAPSULE ORAL
Qty: 0 | Refills: 0 | DISCHARGE

## 2022-10-13 RX ORDER — ONDANSETRON 8 MG/1
4 TABLET, FILM COATED ORAL ONCE
Refills: 0 | Status: DISCONTINUED | OUTPATIENT
Start: 2022-10-13 | End: 2022-10-27

## 2022-10-13 RX ORDER — INDOMETHACIN 50 MG
1 CAPSULE ORAL
Qty: 0 | Refills: 0 | DISCHARGE

## 2022-10-13 RX ORDER — ATORVASTATIN CALCIUM 80 MG/1
1 TABLET, FILM COATED ORAL
Qty: 0 | Refills: 0 | DISCHARGE

## 2022-10-13 RX ORDER — LOSARTAN POTASSIUM 100 MG/1
1 TABLET, FILM COATED ORAL
Qty: 0 | Refills: 0 | DISCHARGE

## 2022-10-13 RX ORDER — CEFAZOLIN SODIUM 1 G
3000 VIAL (EA) INJECTION ONCE
Refills: 0 | Status: COMPLETED | OUTPATIENT
Start: 2022-10-13 | End: 2022-10-13

## 2022-10-13 RX ADMIN — SODIUM CHLORIDE 100 MILLILITER(S): 9 INJECTION, SOLUTION INTRAVENOUS at 06:14

## 2022-10-13 RX ADMIN — FENTANYL CITRATE 25 MICROGRAM(S): 50 INJECTION INTRAVENOUS at 10:11

## 2022-10-13 RX ADMIN — CHLORHEXIDINE GLUCONATE 1 APPLICATION(S): 213 SOLUTION TOPICAL at 06:14

## 2022-10-13 RX ADMIN — SODIUM CHLORIDE 3 MILLILITER(S): 9 INJECTION INTRAMUSCULAR; INTRAVENOUS; SUBCUTANEOUS at 05:56

## 2022-10-13 RX ADMIN — FENTANYL CITRATE 25 MICROGRAM(S): 50 INJECTION INTRAVENOUS at 09:56

## 2022-10-13 NOTE — ASU PATIENT PROFILE, ADULT - FALL HARM RISK - UNIVERSAL INTERVENTIONS
Bed in lowest position, wheels locked, appropriate side rails in place/Call bell, personal items and telephone in reach/Instruct patient to call for assistance before getting out of bed or chair/Non-slip footwear when patient is out of bed/[place] to call system/Physically safe environment - no spills, clutter or unnecessary equipment/Purposeful Proactive Rounding/Room/bathroom lighting operational, light cord in reach

## 2022-10-13 NOTE — ASU DISCHARGE PLAN (ADULT/PEDIATRIC) - NS MD DC FALL RISK RISK
For information on Fall & Injury Prevention, visit: [email or web address] OR  [email or web address] OR  https://www.cdc.gov/steadi/patient.html

## 2022-10-13 NOTE — ASU DISCHARGE PLAN (ADULT/PEDIATRIC) - ASU DC SPECIAL INSTRUCTIONSFT
You were taken to the operating room for a laparoscopic cholecystectomy(removal of gallbladder).  You tolerated the procedure well.    If you are in pain: You may take over the counter Tylenol and Motrin every 4-6 hours as needed. Do not take motrin if you are also taking idocin. You can take on or the other.    Do not wet incisions fo 48 hours. Leave steri strips in place as they will fall off on their own.     Avoid heavy lifting (>15-20 lbs) for the next 4 weeks.    Please schedule an appointment for follow up with Dr. Benz in 2 weeks. Call to make an appointment.     If you experience severe pain, nausea/vomiting, high fever or shortness of breath, please call the office urgently or return to the ER. You were taken to the operating room for a laparoscopic cholecystectomy(removal of gallbladder).  You tolerated the procedure well.  ******************************************************************************************   If you are in pain: You may take over the counter Tylenol and Motrin every 4-6 hours as needed. Do not take motrin if you are also taking idocin. You can take on or the other.  ******************************************************************************************   Do not wet incisions fo 48 hours. Leave steri strips in place as they will fall off on their own.   ******************************************************************************************   Avoid heavy lifting (>15-20 lbs) for the next 4 weeks.  ******************************************************************************************   Please schedule an appointment for follow up with Dr. Benz in 2 weeks. Call to make an appointment.   ******************************************************************************************   If you experience severe pain, nausea/vomiting, high fever or shortness of breath, please call the office urgently or return to the ER.

## 2022-10-13 NOTE — ASU DISCHARGE PLAN (ADULT/PEDIATRIC) - CARE PROVIDER_API CALL
Tim Benz  General Surgery  [address] [place] [address], [address]  [place], [place] [id number]  Phone: [phone]  Fax: [phone]  Established Patient  Follow Up Time: 2 weeks

## 2022-10-13 NOTE — ASU DISCHARGE PLAN (ADULT/PEDIATRIC) - NURSING INSTRUCTIONS
OK to take Tylenol/Acetaminophen at 1:30PM TODAY 10/13 (last dose @ 730AM   in operating room)  for pain and every 6 hours after as needed. OK to take Motrin/Ibuprofen at 3PM TODAY 10/13 (last dose @  9AM   in operating room)  for pain and every 6 hours after as needed.   ******************************************************************************************   Oxycodone also prescribed, PLEASE TAKE FOR SEVERE PAIN ONLY AS THIS MEDICATION CAN CAUSE CONSTIPATION. If taking narcotics, please take miralax 1x/day and colace 3x/day to prevent constipation. These medications can be obtained over the counter. !DO NOT DRIVE OR OPERATE MACHINERY WHILE TAKING NARCOTICS!

## 2022-10-17 ENCOUNTER — TRANSCRIPTION ENCOUNTER (OUTPATIENT)
Age: 47
End: 2022-10-17

## 2022-10-21 LAB — SURGICAL PATHOLOGY STUDY: SIGNIFICANT CHANGE UP

## 2022-10-31 ENCOUNTER — APPOINTMENT (OUTPATIENT)
Dept: SURGERY | Facility: CLINIC | Age: 47
End: 2022-10-31

## 2022-10-31 VITALS
DIASTOLIC BLOOD PRESSURE: 80 MMHG | OXYGEN SATURATION: 97 % | TEMPERATURE: 97.8 F | BODY MASS INDEX: 33.37 KG/M2 | RESPIRATION RATE: 18 BRPM | SYSTOLIC BLOOD PRESSURE: 120 MMHG | HEART RATE: 70 BPM | WEIGHT: 260 LBS | HEIGHT: 74 IN

## 2022-10-31 DIAGNOSIS — Z09 ENCOUNTER FOR FOLLOW-UP EXAMINATION AFTER COMPLETED TREATMENT FOR CONDITIONS OTHER THAN MALIGNANT NEOPLASM: ICD-10-CM

## 2022-10-31 PROCEDURE — 99024 POSTOP FOLLOW-UP VISIT: CPT

## 2022-10-31 NOTE — ASSESSMENT
[FreeTextEntry1] : 47-year-old male recovering uneventfully status post laparoscopic cholecystectomy 10/13/2022.

## 2022-10-31 NOTE — HISTORY OF PRESENT ILLNESS
[de-identified] : Gavin is a 46 y/o male being seen for a post-op visit.s/p Laparoscopic Cholecystectomy 10/13/2022\par \par He is doing well postoperatively, with minimal incisional discomfort, tolerating regular diet, with normal bowel function.  He reports no fevers or chills.

## 2022-10-31 NOTE — PHYSICAL EXAM
[No Rash or Lesion] : No rash or lesion [Calm] : calm [de-identified] : Well-appearing, no acute distress [de-identified] : Sclerae anicteric [de-identified] : Normal respirations [de-identified] : Soft, nontender, nondistended.  Incisions well-healed without erythema or drainage.

## 2022-12-07 ENCOUNTER — TRANSCRIPTION ENCOUNTER (OUTPATIENT)
Age: 47
End: 2022-12-07

## 2023-01-23 ENCOUNTER — TRANSCRIPTION ENCOUNTER (OUTPATIENT)
Age: 48
End: 2023-01-23

## 2023-05-06 NOTE — ED ADULT NURSE NOTE - BREATHING, MLM
If nosebleed starts again, apply nasal clamp for 20 minutes, no peaking.    Follow-up with ear nose throat specialist.  Call tomorrow or Monday morning for an appointment.    Return to the emergency department with any persisting bleeding.  
Spontaneous, unlabored and symmetrical

## 2023-05-24 ENCOUNTER — RX RENEWAL (OUTPATIENT)
Age: 48
End: 2023-05-24

## 2023-06-13 NOTE — ED ADULT NURSE NOTE - DOES PATIENT HAVE ADVANCE DIRECTIVE
Detail Level: Zone
Initiate Treatment: FluoroCal bid x 2-4 weeks
Render In Strict Bullet Format?: No
No

## 2023-06-27 ENCOUNTER — APPOINTMENT (OUTPATIENT)
Dept: OTHER | Facility: CLINIC | Age: 48
End: 2023-06-27
Payer: COMMERCIAL

## 2023-06-27 ENCOUNTER — LABORATORY RESULT (OUTPATIENT)
Age: 48
End: 2023-06-27

## 2023-06-27 VITALS
RESPIRATION RATE: 17 BRPM | BODY MASS INDEX: 34.65 KG/M2 | OXYGEN SATURATION: 96 % | WEIGHT: 270 LBS | HEART RATE: 71 BPM | HEIGHT: 74 IN | TEMPERATURE: 97.6 F | DIASTOLIC BLOOD PRESSURE: 76 MMHG | SYSTOLIC BLOOD PRESSURE: 115 MMHG

## 2023-06-27 DIAGNOSIS — Z04.9 ENCOUNTER FOR EXAMINATION AND OBSERVATION FOR UNSPECIFIED REASON: ICD-10-CM

## 2023-06-27 PROCEDURE — 94010 BREATHING CAPACITY TEST: CPT

## 2023-06-27 PROCEDURE — 99396 PREV VISIT EST AGE 40-64: CPT | Mod: 25

## 2023-06-27 NOTE — DISCUSSION/SUMMARY
[Patient seen for WTC Monitoring ___] : Patient was seen for WTC monitoring [unfilled] [Please See Note in Chart and Documentation in Trial DB] : Please see note in chart and documentation in Trial DB. 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 He states he is feeling good \par \par 10 2022 cholecystectomy for GB stone after cholecystitis \par \par \par  \par Active Problems\par Gout \par Osteoarthritis of foot joint \par Osteophyte of right foot \par Diabetes mellitus \par \par Surgical History\par History of Shoulder Surgery\par \par Family History\par Family history of diabetes mellitus (V18.0) (Z83.3) : Mother\par Family history of gout (V18.19) (Z82.69) : Mother, Father\par \par Social History\par Never a smoker\par No alcohol use\par No drug use\par \par \par Allergies\par No Known Drug Allergies\par \par Review of Systems: reviewed in Trial DB \par \par \par \par \par PE IN trial DB \par  CXR referred \par  spirometry L NL \par  \par Assessment and Plan\par \par [facility]TC annual MV \par pt due for screening colonoscopy for colon ca screening\par referred

## 2023-06-30 LAB
ALBUMIN SERPL ELPH-MCNC: 4.6 G/DL
ALP BLD-CCNC: 50 U/L
ALT SERPL-CCNC: 23 U/L
ANION GAP SERPL CALC-SCNC: 13 MMOL/L
APPEARANCE: CLEAR
AST SERPL-CCNC: 17 U/L
BACTERIA: NEGATIVE /HPF
BILIRUB SERPL-MCNC: 0.6 MG/DL
BILIRUBIN URINE: NEGATIVE
BLOOD URINE: NEGATIVE
BUN SERPL-MCNC: 15 MG/DL
CALCIUM SERPL-MCNC: 9.3 MG/DL
CAST: 1 /LPF
CHLORIDE SERPL-SCNC: 104 MMOL/L
CHOLEST SERPL-MCNC: 114 MG/DL
CO2 SERPL-SCNC: 23 MMOL/L
COLOR: YELLOW
CREAT SERPL-MCNC: 0.87 MG/DL
EGFR: 106 ML/MIN/1.73M2
EPITHELIAL CELLS: 0 /HPF
GLUCOSE QUALITATIVE U: NEGATIVE MG/DL
GLUCOSE SERPL-MCNC: 117 MG/DL
HDLC SERPL-MCNC: 28 MG/DL
KETONES URINE: NEGATIVE MG/DL
LDLC SERPL CALC-MCNC: 41 MG/DL
LEUKOCYTE ESTERASE URINE: NEGATIVE
MICROSCOPIC-UA: NORMAL
NITRITE URINE: NEGATIVE
NONHDLC SERPL-MCNC: 85 MG/DL
PH URINE: 5.5
POTASSIUM SERPL-SCNC: 4.2 MMOL/L
PROT SERPL-MCNC: 7 G/DL
PROTEIN URINE: NEGATIVE MG/DL
RED BLOOD CELLS URINE: 0 /HPF
SODIUM SERPL-SCNC: 141 MMOL/L
SPECIFIC GRAVITY URINE: 1.01
TRIGL SERPL-MCNC: 224 MG/DL
UROBILINOGEN URINE: 0.2 MG/DL
WHITE BLOOD CELLS URINE: 0 /HPF

## 2023-10-03 ENCOUNTER — RX RENEWAL (OUTPATIENT)
Age: 48
End: 2023-10-03

## 2023-10-25 RX ORDER — BLOOD SUGAR DIAGNOSTIC
STRIP MISCELLANEOUS DAILY
Qty: 2 | Refills: 0 | Status: ACTIVE | COMMUNITY
Start: 2020-11-23 | End: 1900-01-01

## 2023-10-25 RX ORDER — LANCETS 33 GAUGE
EACH MISCELLANEOUS
Qty: 2 | Refills: 0 | Status: ACTIVE | COMMUNITY
Start: 2020-11-23 | End: 1900-01-01

## 2023-10-26 ENCOUNTER — TRANSCRIPTION ENCOUNTER (OUTPATIENT)
Age: 48
End: 2023-10-26

## 2023-10-27 ENCOUNTER — RX RENEWAL (OUTPATIENT)
Age: 48
End: 2023-10-27

## 2023-11-02 ENCOUNTER — APPOINTMENT (OUTPATIENT)
Dept: GASTROENTEROLOGY | Facility: CLINIC | Age: 48
End: 2023-11-02
Payer: COMMERCIAL

## 2023-11-02 VITALS
DIASTOLIC BLOOD PRESSURE: 70 MMHG | HEART RATE: 70 BPM | OXYGEN SATURATION: 98 % | WEIGHT: 270 LBS | BODY MASS INDEX: 34.65 KG/M2 | HEIGHT: 74 IN | SYSTOLIC BLOOD PRESSURE: 121 MMHG

## 2023-11-02 DIAGNOSIS — Z12.11 ENCOUNTER FOR SCREENING FOR MALIGNANT NEOPLASM OF COLON: ICD-10-CM

## 2023-11-02 PROCEDURE — 99213 OFFICE O/P EST LOW 20 MIN: CPT

## 2023-11-02 RX ORDER — SODIUM SULFATE, POTASSIUM SULFATE AND MAGNESIUM SULFATE 1.6; 3.13; 17.5 G/177ML; G/177ML; G/177ML
17.5-3.13-1.6 SOLUTION ORAL
Qty: 1 | Refills: 0 | Status: ACTIVE | COMMUNITY
Start: 2023-11-02 | End: 1900-01-01

## 2023-11-02 NOTE — PHYSICAL EXAM
[Alert] : alert [Well Nourished] : well nourished [No Acute Distress] : no acute distress [Well Developed] : well developed [Normal Sclera/Conjunctiva] : normal sclera/conjunctiva [EOMI] : extra ocular movement intact [No Proptosis] : no proptosis [Normal Oropharynx] : the oropharynx was normal [Thyroid Not Enlarged] : the thyroid was not enlarged [No Thyroid Nodules] : no palpable thyroid nodules [No Respiratory Distress] : no respiratory distress [No Accessory Muscle Use] : no accessory muscle use [Clear to Auscultation] : lungs were clear to auscultation bilaterally [Normal S1, S2] : normal S1 and S2 [Normal Rate] : heart rate was normal [Regular Rhythm] : with a regular rhythm [No Edema] : no peripheral edema [Pedal Pulses Normal] : the pedal pulses are present [Normal Bowel Sounds] : normal bowel sounds [Not Tender] : non-tender [Not Distended] : not distended [Soft] : abdomen soft [Normal Anterior Cervical Nodes] : no anterior cervical lymphadenopathy [Normal Posterior Cervical Nodes] : no posterior cervical lymphadenopathy [No Spinal Tenderness] : no spinal tenderness [Spine Straight] : spine straight [No Stigmata of Cushings Syndrome] : no stigmata of Cushings Syndrome [Normal Gait] : normal gait [Normal Strength/Tone] : muscle strength and tone were normal [No Rash] : no rash [Normal Reflexes] : deep tendon reflexes were 2+ and symmetric [No Tremors] : no tremors [Oriented x3] : oriented to person, place, and time [Acanthosis Nigricans] : no acanthosis nigricans Rotation Flap Text: The defect edges were debeveled with a #15 scalpel blade.  Given the location of the defect, shape of the defect and the proximity to free margins a rotation flap was deemed most appropriate.  Using a sterile surgical marker, an appropriate rotation flap was drawn incorporating the defect and placing the expected incisions within the relaxed skin tension lines where possible.    The area thus outlined was incised deep to adipose tissue with a #15 scalpel blade.  The skin margins were undermined to an appropriate distance in all directions utilizing iris scissors.

## 2023-12-12 ENCOUNTER — RX RENEWAL (OUTPATIENT)
Age: 48
End: 2023-12-12

## 2023-12-13 ENCOUNTER — RX RENEWAL (OUTPATIENT)
Age: 48
End: 2023-12-13

## 2023-12-26 ENCOUNTER — APPOINTMENT (OUTPATIENT)
Dept: CARDIOLOGY | Facility: CLINIC | Age: 48
End: 2023-12-26
Payer: COMMERCIAL

## 2023-12-26 ENCOUNTER — LABORATORY RESULT (OUTPATIENT)
Age: 48
End: 2023-12-26

## 2023-12-26 VITALS
BODY MASS INDEX: 34.65 KG/M2 | SYSTOLIC BLOOD PRESSURE: 110 MMHG | TEMPERATURE: 98.8 F | HEART RATE: 98 BPM | OXYGEN SATURATION: 98 % | DIASTOLIC BLOOD PRESSURE: 70 MMHG | HEIGHT: 74 IN | WEIGHT: 270 LBS

## 2023-12-26 DIAGNOSIS — Z12.11 ENCOUNTER FOR SCREENING FOR MALIGNANT NEOPLASM OF COLON: ICD-10-CM

## 2023-12-26 DIAGNOSIS — Z71.85 ENCOUNTER FOR IMMUNIZATION SAFETY COUNSELING: ICD-10-CM

## 2023-12-26 DIAGNOSIS — R79.89 OTHER SPECIFIED ABNORMAL FINDINGS OF BLOOD CHEMISTRY: ICD-10-CM

## 2023-12-26 DIAGNOSIS — Z12.5 ENCOUNTER FOR SCREENING FOR MALIGNANT NEOPLASM OF PROSTATE: ICD-10-CM

## 2023-12-26 DIAGNOSIS — Z13.228 ENCOUNTER FOR SCREENING FOR OTHER METABOLIC DISORDERS: ICD-10-CM

## 2023-12-26 DIAGNOSIS — Z00.00 ENCOUNTER FOR GENERAL ADULT MEDICAL EXAMINATION W/OUT ABNORMAL FINDINGS: ICD-10-CM

## 2023-12-26 PROCEDURE — 93000 ELECTROCARDIOGRAM COMPLETE: CPT

## 2023-12-26 PROCEDURE — 99396 PREV VISIT EST AGE 40-64: CPT

## 2023-12-26 NOTE — HISTORY OF PRESENT ILLNESS
[de-identified] : This is a 47 y/o male with a PMhx of HTN, HLD and DM here today for follow up. Patient going for colonoscopy tomorrow. Patient without any complaints.   Denies chest pain, palpitations, cough, wheezing, SOB/MEZA, vision changes, HA, dizziness, syncope, diaphoresis, skin changes/lesions, n/v/d/c/reflux, urinary symptoms,   fever, chills, infection, fatigue, sleep disturbance, swelling, myalgia, arthralgia, weight changes, changes in medications, recent trauma, surgery, hospitalization, travel.

## 2023-12-27 ENCOUNTER — APPOINTMENT (OUTPATIENT)
Dept: GASTROENTEROLOGY | Facility: AMBULATORY MEDICAL SERVICES | Age: 48
End: 2023-12-27
Payer: COMMERCIAL

## 2023-12-27 PROCEDURE — 45385 COLONOSCOPY W/LESION REMOVAL: CPT | Mod: 33

## 2023-12-27 PROCEDURE — 45380 COLONOSCOPY AND BIOPSY: CPT | Mod: 33,59

## 2024-01-02 ENCOUNTER — NON-APPOINTMENT (OUTPATIENT)
Age: 49
End: 2024-01-02

## 2024-01-03 LAB
25(OH)D3 SERPL-MCNC: 18.6 NG/ML
ALBUMIN SERPL ELPH-MCNC: 4.6 G/DL
ALP BLD-CCNC: 64 U/L
ALT SERPL-CCNC: 29 U/L
ANION GAP SERPL CALC-SCNC: 16 MMOL/L
APPEARANCE: CLEAR
AST SERPL-CCNC: 22 U/L
BACTERIA: NEGATIVE /HPF
BASOPHILS # BLD AUTO: 0.09 K/UL
BASOPHILS NFR BLD AUTO: 1.7 %
BILIRUB DIRECT SERPL-MCNC: 0.2 MG/DL
BILIRUB INDIRECT SERPL-MCNC: 0.6 MG/DL
BILIRUB SERPL-MCNC: 0.7 MG/DL
BILIRUBIN URINE: NEGATIVE
BLOOD URINE: NEGATIVE
BUN SERPL-MCNC: 13 MG/DL
CALCIUM SERPL-MCNC: 9.4 MG/DL
CAST: 0 /LPF
CHLORIDE SERPL-SCNC: 103 MMOL/L
CHOLEST SERPL-MCNC: 169 MG/DL
CK SERPL-CCNC: 259 U/L
CO2 SERPL-SCNC: 22 MMOL/L
COLOR: YELLOW
CREAT SERPL-MCNC: 0.93 MG/DL
CREAT SPEC-SCNC: 107 MG/DL
EGFR: 100 ML/MIN/1.73M2
EOSINOPHIL # BLD AUTO: 0.08 K/UL
EOSINOPHIL NFR BLD AUTO: 1.5 %
EPITHELIAL CELLS: 0 /HPF
ESTIMATED AVERAGE GLUCOSE: 151 MG/DL
FERRITIN SERPL-MCNC: 705 NG/ML
FOLATE SERPL-MCNC: 18.9 NG/ML
GLUCOSE QUALITATIVE U: NEGATIVE MG/DL
GLUCOSE SERPL-MCNC: 125 MG/DL
HBA1C MFR BLD HPLC: 6.9 %
HCT VFR BLD CALC: 46.4 %
HDLC SERPL-MCNC: 33 MG/DL
HGB BLD-MCNC: 13.9 G/DL
IMM GRANULOCYTES NFR BLD AUTO: 0.6 %
IRON SERPL-MCNC: 175 UG/DL
KETONES URINE: NEGATIVE MG/DL
LDLC SERPL CALC-MCNC: 89 MG/DL
LEUKOCYTE ESTERASE URINE: NEGATIVE
LYMPHOCYTES # BLD AUTO: 1.35 K/UL
LYMPHOCYTES NFR BLD AUTO: 26 %
MAGNESIUM SERPL-MCNC: 2 MG/DL
MAN DIFF?: NORMAL
MCHC RBC-ENTMCNC: 20.2 PG
MCHC RBC-ENTMCNC: 30 GM/DL
MCV RBC AUTO: 67.5 FL
MICROALBUMIN 24H UR DL<=1MG/L-MCNC: <1.2 MG/DL
MICROALBUMIN/CREAT 24H UR-RTO: NORMAL MG/G
MICROSCOPIC-UA: NORMAL
MONOCYTES # BLD AUTO: 0.42 K/UL
MONOCYTES NFR BLD AUTO: 8.1 %
NEUTROPHILS # BLD AUTO: 3.23 K/UL
NEUTROPHILS NFR BLD AUTO: 62.1 %
NITRITE URINE: NEGATIVE
NONHDLC SERPL-MCNC: 136 MG/DL
PH URINE: 5.5
PHOSPHATE SERPL-MCNC: 3.7 MG/DL
PLATELET # BLD AUTO: 264 K/UL
POTASSIUM SERPL-SCNC: 4.7 MMOL/L
PROT SERPL-MCNC: 7.3 G/DL
PROTEIN URINE: NEGATIVE MG/DL
PSA SERPL-MCNC: 0.38 NG/ML
RBC # BLD: 6.87 M/UL
RBC # FLD: 17.9 %
RED BLOOD CELLS URINE: 0 /HPF
SODIUM SERPL-SCNC: 141 MMOL/L
SPECIFIC GRAVITY URINE: 1.02
T3FREE SERPL-MCNC: 2.97 PG/ML
T4 FREE SERPL-MCNC: 0.9 NG/DL
TRIGL SERPL-MCNC: 284 MG/DL
TSH SERPL-ACNC: 1.27 UIU/ML
URATE SERPL-MCNC: 6.7 MG/DL
UROBILINOGEN URINE: 0.2 MG/DL
VIT B12 SERPL-MCNC: 357 PG/ML
WBC # FLD AUTO: 5.2 K/UL
WHITE BLOOD CELLS URINE: 0 /HPF

## 2024-01-03 RX ORDER — GLUCOSAMINE HCL 500 MG
75 MCG TABLET ORAL
Refills: 0 | Status: ACTIVE | COMMUNITY
Start: 2024-01-03

## 2024-01-03 RX ORDER — ATORVASTATIN CALCIUM 40 MG/1
40 TABLET, FILM COATED ORAL DAILY
Qty: 90 | Refills: 0 | Status: ACTIVE | COMMUNITY
Start: 2022-08-23 | End: 1900-01-01

## 2024-01-06 ENCOUNTER — RX RENEWAL (OUTPATIENT)
Age: 49
End: 2024-01-06

## 2024-01-08 ENCOUNTER — APPOINTMENT (OUTPATIENT)
Dept: CT IMAGING | Facility: CLINIC | Age: 49
End: 2024-01-08
Payer: SELF-PAY

## 2024-01-08 ENCOUNTER — OUTPATIENT (OUTPATIENT)
Dept: OUTPATIENT SERVICES | Facility: HOSPITAL | Age: 49
LOS: 1 days | End: 2024-01-08
Payer: SELF-PAY

## 2024-01-08 DIAGNOSIS — E11.9 TYPE 2 DIABETES MELLITUS WITHOUT COMPLICATIONS: ICD-10-CM

## 2024-01-08 DIAGNOSIS — Z00.8 ENCOUNTER FOR OTHER GENERAL EXAMINATION: ICD-10-CM

## 2024-01-08 DIAGNOSIS — Z98.89 OTHER SPECIFIED POSTPROCEDURAL STATES: Chronic | ICD-10-CM

## 2024-01-08 PROCEDURE — 75571 CT HRT W/O DYE W/CA TEST: CPT

## 2024-01-08 PROCEDURE — 75571 CT HRT W/O DYE W/CA TEST: CPT | Mod: 26

## 2024-02-02 ENCOUNTER — TRANSCRIPTION ENCOUNTER (OUTPATIENT)
Age: 49
End: 2024-02-02

## 2024-02-11 ENCOUNTER — RX RENEWAL (OUTPATIENT)
Age: 49
End: 2024-02-11

## 2024-02-11 RX ORDER — GLIMEPIRIDE 2 MG/1
2 TABLET ORAL
Qty: 90 | Refills: 1 | Status: ACTIVE | COMMUNITY
Start: 2022-03-30 | End: 1900-01-01

## 2024-02-11 RX ORDER — METFORMIN ER 500 MG 500 MG/1
500 TABLET ORAL
Qty: 270 | Refills: 1 | Status: ACTIVE | COMMUNITY
Start: 2019-07-26 | End: 1900-01-01

## 2024-02-14 ENCOUNTER — APPOINTMENT (OUTPATIENT)
Dept: ENDOCRINOLOGY | Facility: CLINIC | Age: 49
End: 2024-02-14
Payer: COMMERCIAL

## 2024-02-14 VITALS
OXYGEN SATURATION: 98 % | HEIGHT: 74 IN | WEIGHT: 270 LBS | HEART RATE: 75 BPM | BODY MASS INDEX: 34.65 KG/M2 | DIASTOLIC BLOOD PRESSURE: 80 MMHG | SYSTOLIC BLOOD PRESSURE: 120 MMHG

## 2024-02-14 DIAGNOSIS — E78.5 HYPERLIPIDEMIA, UNSPECIFIED: ICD-10-CM

## 2024-02-14 DIAGNOSIS — E55.9 VITAMIN D DEFICIENCY, UNSPECIFIED: ICD-10-CM

## 2024-02-14 DIAGNOSIS — E66.9 OBESITY, UNSPECIFIED: ICD-10-CM

## 2024-02-14 DIAGNOSIS — E11.9 TYPE 2 DIABETES MELLITUS W/OUT COMPLICATIONS: ICD-10-CM

## 2024-02-14 DIAGNOSIS — E27.8 OTHER SPECIFIED DISORDERS OF ADRENAL GLAND: ICD-10-CM

## 2024-02-14 DIAGNOSIS — R03.0 ELEVATED BLOOD-PRESSURE READING, W/OUT DIAGNOSIS OF HYPERTENSION: ICD-10-CM

## 2024-02-14 LAB
GLUCOSE BLDC GLUCOMTR-MCNC: 106
HBA1C MFR BLD HPLC: 7.7

## 2024-02-14 PROCEDURE — 82962 GLUCOSE BLOOD TEST: CPT

## 2024-02-14 PROCEDURE — 99214 OFFICE O/P EST MOD 30 MIN: CPT

## 2024-02-14 PROCEDURE — 36415 COLL VENOUS BLD VENIPUNCTURE: CPT

## 2024-02-14 PROCEDURE — 83036 HEMOGLOBIN GLYCOSYLATED A1C: CPT | Mod: QW

## 2024-02-14 RX ORDER — DEXAMETHASONE 1 MG/1
1 TABLET ORAL
Qty: 1 | Refills: 0 | Status: ACTIVE | COMMUNITY
Start: 2024-02-14 | End: 1900-01-01

## 2024-02-15 LAB
ALDOSTERONE SERUM: 7.2 NG/DL
DHEA-S SERPL-MCNC: 81.3 UG/DL

## 2024-02-17 ENCOUNTER — LABORATORY RESULT (OUTPATIENT)
Age: 49
End: 2024-02-17

## 2024-02-19 NOTE — HISTORY OF PRESENT ILLNESS
[FreeTextEntry1] : Mr. LACEY PIERCE is a 46-year-old male who returns with regard to a hx of type 2 DM. The diabetes has been present for about under 2 years. He denies any history of retinopathy, neuropathy or nephropathy.  Current diabetic medications include Glimepiride 2mg once daily.  Stopped Metformin  mg (3 tabs daily) a few months ago due to GI upset.   He denies polyuria, polydipsia, or any visual changes. He too denies any skin lesions, skin breakdown or non-healing areas of skin. He too denies any podiatric concerns. Ophthalmologic evaluation is up to date- no diabetic changes noted.   HGM is very carried out minimally. He does deny any hypoglycemia or hypoglycemic signs or symptoms.  POCT A1C returned today at 7.7%, previously 6.9% in December 2023 and prior 8.1% in September 2022.  POCT glucose today at 106 mg/dL.  Patient states that he has been following dietary discretion with minimal carb intake.   Following with hematologist, Dr. Arita due to elevated ferratin level. Had MRI at [facility] on 02/06/24 which showed evidence for fatty liver. The MRI did also reveal evidence for a 3.4 cm adrenal myelolipoma.   Additional medical history includes that of HTN, thalassemia, fatty liver, gout. Additional Medications: Losartan, colchicine as needed - no over the counter supplements.   Operates heavy equipment.

## 2024-02-19 NOTE — ADDENDUM
[FreeTextEntry1] : POCT glucose testing and Hemoglobin A1c was carried out today given diabetes diagnosis. Blood will be drawn in office today.

## 2024-02-22 LAB — 17OHP SERPL-MCNC: 54 NG/DL

## 2024-02-26 LAB — RENIN ACTIVITY, PLASMA: 8.92 NG/ML/HR

## 2024-02-27 LAB
METANEPHRINE, PL: 25.7 PG/ML
NORMETANEPHRINE, PL: 148.2 PG/ML

## 2024-03-15 LAB
25(OH)D3 SERPL-MCNC: 22.9 NG/ML
ALBUMIN SERPL ELPH-MCNC: 4.4 G/DL
ALBUMIN SERPL ELPH-MCNC: 4.4 G/DL
ALBUMIN SERPL ELPH-MCNC: 4.5 G/DL
ALBUMIN SERPL ELPH-MCNC: 4.6 G/DL
ALBUMIN SERPL ELPH-MCNC: 4.7 G/DL
ALP BLD-CCNC: 68 U/L
ALP BLD-CCNC: 69 U/L
ALP BLD-CCNC: 70 U/L
ALP BLD-CCNC: 71 U/L
ALP BLD-CCNC: 77 U/L
ALT SERPL-CCNC: 31 U/L
ALT SERPL-CCNC: 48 U/L
ALT SERPL-CCNC: 54 U/L
AMYLASE/CREAT SERPL: 59 U/L
ANION GAP SERPL CALC-SCNC: 12 MMOL/L
ANION GAP SERPL CALC-SCNC: 13 MMOL/L
ANION GAP SERPL CALC-SCNC: 13 MMOL/L
ANION GAP SERPL CALC-SCNC: 14 MMOL/L
ANION GAP SERPL CALC-SCNC: 15 MMOL/L
APPEARANCE: CLEAR
AST SERPL-CCNC: 24 U/L
AST SERPL-CCNC: 27 U/L
AST SERPL-CCNC: 27 U/L
AST SERPL-CCNC: 33 U/L
AST SERPL-CCNC: 33 U/L
BACTERIA UR CULT: NORMAL
BACTERIA: NEGATIVE
BASOPHILS # BLD AUTO: 0.05 K/UL
BASOPHILS # BLD AUTO: 0.07 K/UL
BASOPHILS # BLD AUTO: 0.08 K/UL
BASOPHILS # BLD AUTO: 0.09 K/UL
BASOPHILS NFR BLD AUTO: 1.1 %
BASOPHILS NFR BLD AUTO: 1.3 %
BASOPHILS NFR BLD AUTO: 1.6 %
BASOPHILS NFR BLD AUTO: 1.6 %
BILIRUB DIRECT SERPL-MCNC: 0.2 MG/DL
BILIRUB INDIRECT SERPL-MCNC: 0.6 MG/DL
BILIRUB SERPL-MCNC: 0.4 MG/DL
BILIRUB SERPL-MCNC: 0.4 MG/DL
BILIRUB SERPL-MCNC: 0.8 MG/DL
BILIRUB SERPL-MCNC: 0.9 MG/DL
BILIRUBIN URINE: NEGATIVE
BLOOD URINE: NEGATIVE
BUN SERPL-MCNC: 14 MG/DL
BUN SERPL-MCNC: 15 MG/DL
BUN SERPL-MCNC: 16 MG/DL
BUN SERPL-MCNC: 16 MG/DL
BUN SERPL-MCNC: 17 MG/DL
CALCIUM SERPL-MCNC: 9.3 MG/DL
CALCIUM SERPL-MCNC: 9.4 MG/DL
CALCIUM SERPL-MCNC: 9.5 MG/DL
CALCIUM SERPL-MCNC: 9.6 MG/DL
CALCIUM SERPL-MCNC: 9.6 MG/DL
CELIAC DISEASE INTERPRETATION: NORMAL
CELIAC GENE PAIRS PRESENT: NO
CHLORIDE SERPL-SCNC: 101 MMOL/L
CHLORIDE SERPL-SCNC: 103 MMOL/L
CHLORIDE SERPL-SCNC: 103 MMOL/L
CHLORIDE SERPL-SCNC: 104 MMOL/L
CHLORIDE SERPL-SCNC: 104 MMOL/L
CHOLEST SERPL-MCNC: 110 MG/DL
CHOLEST SERPL-MCNC: 140 MG/DL
CHOLEST SERPL-MCNC: 173 MG/DL
CHOLEST SERPL-MCNC: 183 MG/DL
CK SERPL-CCNC: 242 U/L
CO2 SERPL-SCNC: 23 MMOL/L
CO2 SERPL-SCNC: 24 MMOL/L
CO2 SERPL-SCNC: 26 MMOL/L
COLOR: YELLOW
CREAT SERPL-MCNC: 0.91 MG/DL
CREAT SERPL-MCNC: 0.91 MG/DL
CREAT SERPL-MCNC: 0.95 MG/DL
CREAT SERPL-MCNC: 0.95 MG/DL
CREAT SERPL-MCNC: 0.97 MG/DL
CREAT SPEC-SCNC: 108 MG/DL
CREAT SPEC-SCNC: 259 MG/DL
DQ ALPHA 1: NORMAL
DQ BETA 1: NORMAL
EGFR: 103 ML/MIN/1.73M2
EGFR: 96 ML/MIN/1.73M2
EGFR: 99 ML/MIN/1.73M2
EGFR: 99 ML/MIN/1.73M2
EOSINOPHIL # BLD AUTO: 0.06 K/UL
EOSINOPHIL # BLD AUTO: 0.08 K/UL
EOSINOPHIL # BLD AUTO: 0.1 K/UL
EOSINOPHIL # BLD AUTO: 0.13 K/UL
EOSINOPHIL NFR BLD AUTO: 1.3 %
EOSINOPHIL NFR BLD AUTO: 1.4 %
EOSINOPHIL NFR BLD AUTO: 2 %
EOSINOPHIL NFR BLD AUTO: 2.4 %
ESTIMATED AVERAGE GLUCOSE: 174 MG/DL
ESTIMATED AVERAGE GLUCOSE: 192 MG/DL
FERRITIN SERPL-MCNC: 872 NG/ML
FOLATE SERPL-MCNC: 18.3 NG/ML
GGT SERPL-CCNC: 35 U/L
GLUCOSE QUALITATIVE U: NEGATIVE
GLUCOSE SERPL-MCNC: 100 MG/DL
GLUCOSE SERPL-MCNC: 133 MG/DL
GLUCOSE SERPL-MCNC: 135 MG/DL
GLUCOSE SERPL-MCNC: 78 MG/DL
GLUCOSE SERPL-MCNC: 85 MG/DL
HAV IGM SER QL: NONREACTIVE
HBA1C MFR BLD HPLC: 7.7 %
HBA1C MFR BLD HPLC: 8.3 %
HBV CORE IGM SER QL: NONREACTIVE
HBV SURFACE AB SERPL IA-ACNC: 5 MIU/ML
HBV SURFACE AG SER QL: NONREACTIVE
HCT VFR BLD CALC: 42.3 %
HCT VFR BLD CALC: 43.3 %
HCT VFR BLD CALC: 43.5 %
HCT VFR BLD CALC: 45 %
HCV AB SER QL: NONREACTIVE
HCV S/CO RATIO: 0.1 S/CO
HDLC SERPL-MCNC: 23 MG/DL
HDLC SERPL-MCNC: 24 MG/DL
HDLC SERPL-MCNC: 26 MG/DL
HDLC SERPL-MCNC: 31 MG/DL
HGB BLD-MCNC: 12.7 G/DL
HGB BLD-MCNC: 12.8 G/DL
HGB BLD-MCNC: 13.6 G/DL
HGB BLD-MCNC: 13.6 G/DL
HYALINE CASTS: 0 /LPF
HYALINE CASTS: 0 /LPF
HYALINE CASTS: 1 /LPF
IMM GRANULOCYTES NFR BLD AUTO: 0.4 %
IMM GRANULOCYTES NFR BLD AUTO: 0.6 %
IMMUNOGLOBULIN A (IGA): 387 MG/DL
IRON SATN MFR SERPL: 26 %
IRON SERPL-MCNC: 70 UG/DL
KETONES URINE: NEGATIVE
KETONES URINE: NORMAL
KETONES URINE: NORMAL
LDLC SERPL CALC-MCNC: 52 MG/DL
LDLC SERPL CALC-MCNC: 64 MG/DL
LDLC SERPL CALC-MCNC: NORMAL MG/DL
LDLC SERPL CALC-MCNC: NORMAL MG/DL
LEUKOCYTE ESTERASE URINE: NEGATIVE
LPL SERPL-CCNC: 47 U/L
LYMPHOCYTES # BLD AUTO: 1.33 K/UL
LYMPHOCYTES # BLD AUTO: 1.41 K/UL
LYMPHOCYTES # BLD AUTO: 1.49 K/UL
LYMPHOCYTES # BLD AUTO: 1.56 K/UL
LYMPHOCYTES NFR BLD AUTO: 26 %
LYMPHOCYTES NFR BLD AUTO: 26.3 %
LYMPHOCYTES NFR BLD AUTO: 28.1 %
LYMPHOCYTES NFR BLD AUTO: 33.3 %
MAGNESIUM SERPL-MCNC: 2 MG/DL
MAGNESIUM SERPL-MCNC: 2.1 MG/DL
MAN DIFF?: NORMAL
MCHC RBC-ENTMCNC: 19.4 PG
MCHC RBC-ENTMCNC: 19.5 PG
MCHC RBC-ENTMCNC: 19.9 PG
MCHC RBC-ENTMCNC: 20.5 PG
MCHC RBC-ENTMCNC: 29.4 GM/DL
MCHC RBC-ENTMCNC: 30 GM/DL
MCHC RBC-ENTMCNC: 30.2 GM/DL
MCHC RBC-ENTMCNC: 31.4 GM/DL
MCV RBC AUTO: 64.1 FL
MCV RBC AUTO: 65.1 FL
MCV RBC AUTO: 66.3 FL
MCV RBC AUTO: 66.4 FL
MICROALBUMIN 24H UR DL<=1MG/L-MCNC: 1.3 MG/DL
MICROALBUMIN 24H UR DL<=1MG/L-MCNC: <1.2 MG/DL
MICROALBUMIN/CREAT 24H UR-RTO: 5 MG/G
MICROALBUMIN/CREAT 24H UR-RTO: NORMAL MG/G
MICROSCOPIC-UA: NORMAL
MONOCYTES # BLD AUTO: 0.29 K/UL
MONOCYTES # BLD AUTO: 0.37 K/UL
MONOCYTES # BLD AUTO: 0.4 K/UL
MONOCYTES # BLD AUTO: 0.45 K/UL
MONOCYTES NFR BLD AUTO: 6.5 %
MONOCYTES NFR BLD AUTO: 6.7 %
MONOCYTES NFR BLD AUTO: 7.8 %
MONOCYTES NFR BLD AUTO: 8.4 %
NEUTROPHILS # BLD AUTO: 2.57 K/UL
NEUTROPHILS # BLD AUTO: 3.18 K/UL
NEUTROPHILS # BLD AUTO: 3.28 K/UL
NEUTROPHILS # BLD AUTO: 3.44 K/UL
NEUTROPHILS NFR BLD AUTO: 57.4 %
NEUTROPHILS NFR BLD AUTO: 61 %
NEUTROPHILS NFR BLD AUTO: 61.8 %
NEUTROPHILS NFR BLD AUTO: 62.2 %
NITRITE URINE: NEGATIVE
NONHDLC SERPL-MCNC: 116 MG/DL
NONHDLC SERPL-MCNC: 149 MG/DL
NONHDLC SERPL-MCNC: 157 MG/DL
NONHDLC SERPL-MCNC: 78 MG/DL
PH URINE: 5.5
PH URINE: 5.5
PH URINE: 6
PHOSPHATE SERPL-MCNC: 3.7 MG/DL
PLATELET # BLD AUTO: 248 K/UL
PLATELET # BLD AUTO: 260 K/UL
PLATELET # BLD AUTO: 281 K/UL
PLATELET # BLD AUTO: 287 K/UL
POTASSIUM SERPL-SCNC: 4 MMOL/L
POTASSIUM SERPL-SCNC: 4.2 MMOL/L
POTASSIUM SERPL-SCNC: 4.4 MMOL/L
POTASSIUM SERPL-SCNC: 4.5 MMOL/L
POTASSIUM SERPL-SCNC: 4.6 MMOL/L
PROT SERPL-MCNC: 7.2 G/DL
PROT SERPL-MCNC: 7.3 G/DL
PROT SERPL-MCNC: 7.3 G/DL
PROT SERPL-MCNC: 7.5 G/DL
PROTEIN URINE: NORMAL
PSA SERPL-MCNC: 0.4 NG/ML
PSA SERPL-MCNC: 0.41 NG/ML
PSA SERPL-MCNC: 0.51 NG/ML
PSA SERPL-MCNC: 0.51 NG/ML
RBC # BLD: 6.37 M/UL
RBC # BLD: 6.56 M/UL
RBC # BLD: 6.65 M/UL
RBC # BLD: 7.02 M/UL
RBC # FLD: 16.6 %
RBC # FLD: 16.7 %
RBC # FLD: 16.7 %
RBC # FLD: 17.2 %
RED BLOOD CELLS URINE: 1 /HPF
RED BLOOD CELLS URINE: 2 /HPF
RED BLOOD CELLS URINE: 2 /HPF
SARS-COV-2 IGG SERPL IA-ACNC: 32.7 INDEX
SARS-COV-2 IGG SERPL QL IA: POSITIVE
SODIUM SERPL-SCNC: 138 MMOL/L
SODIUM SERPL-SCNC: 141 MMOL/L
SPECIFIC GRAVITY URINE: 1.02
SPECIFIC GRAVITY URINE: 1.02
SPECIFIC GRAVITY URINE: 1.03
SQUAMOUS EPITHELIAL CELLS: 0 /HPF
T3RU NFR SERPL: 1.2 TBI
T4 FREE SERPL-MCNC: 1.3 NG/DL
T4 SERPL-MCNC: 9.1 UG/DL
TIBC SERPL-MCNC: 271 UG/DL
TRANSFERRIN SERPL-MCNC: 220 MG/DL
TRIGL SERPL-MCNC: 131 MG/DL
TRIGL SERPL-MCNC: 260 MG/DL
TRIGL SERPL-MCNC: 512 MG/DL
TRIGL SERPL-MCNC: 623 MG/DL
TSH SERPL-ACNC: 1.12 UIU/ML
UIBC SERPL-MCNC: 201 UG/DL
URATE SERPL-MCNC: 6.2 MG/DL
URATE SERPL-MCNC: 6.6 MG/DL
UROBILINOGEN URINE: NORMAL
VIT B12 SERPL-MCNC: 420 PG/ML
WBC # FLD AUTO: 4.48 K/UL
WBC # FLD AUTO: 5.11 K/UL
WBC # FLD AUTO: 5.37 K/UL
WBC # FLD AUTO: 5.56 K/UL
WHITE BLOOD CELLS URINE: 0 /HPF
WHITE BLOOD CELLS URINE: 1 /HPF
WHITE BLOOD CELLS URINE: 2 /HPF

## 2024-04-04 ENCOUNTER — RX RENEWAL (OUTPATIENT)
Age: 49
End: 2024-04-04

## 2024-04-04 RX ORDER — LOSARTAN POTASSIUM 50 MG/1
50 TABLET, FILM COATED ORAL
Qty: 90 | Refills: 1 | Status: ACTIVE | COMMUNITY
Start: 2022-08-23 | End: 1900-01-01

## 2024-06-17 ENCOUNTER — APPOINTMENT (OUTPATIENT)
Dept: OTHER | Facility: CLINIC | Age: 49
End: 2024-06-17

## 2024-06-24 ENCOUNTER — APPOINTMENT (OUTPATIENT)
Dept: CARDIOLOGY | Facility: CLINIC | Age: 49
End: 2024-06-24

## 2024-07-08 ENCOUNTER — TRANSCRIPTION ENCOUNTER (OUTPATIENT)
Age: 49
End: 2024-07-08

## 2024-09-25 ENCOUNTER — TRANSCRIPTION ENCOUNTER (OUTPATIENT)
Age: 49
End: 2024-09-25

## 2024-11-04 ENCOUNTER — TRANSCRIPTION ENCOUNTER (OUTPATIENT)
Age: 49
End: 2024-11-04

## 2025-01-20 ENCOUNTER — RX RENEWAL (OUTPATIENT)
Age: 50
End: 2025-01-20

## 2025-01-21 ENCOUNTER — APPOINTMENT (OUTPATIENT)
Dept: OTHER | Facility: CLINIC | Age: 50
End: 2025-01-21
Payer: COMMERCIAL

## 2025-01-21 VITALS
OXYGEN SATURATION: 96 % | TEMPERATURE: 98.7 F | BODY MASS INDEX: 34.65 KG/M2 | DIASTOLIC BLOOD PRESSURE: 87 MMHG | HEIGHT: 74 IN | WEIGHT: 270 LBS | SYSTOLIC BLOOD PRESSURE: 122 MMHG | RESPIRATION RATE: 18 BRPM | HEART RATE: 98 BPM

## 2025-01-21 DIAGNOSIS — Z04.9 ENCOUNTER FOR EXAMINATION AND OBSERVATION FOR UNSPECIFIED REASON: ICD-10-CM

## 2025-01-21 PROCEDURE — 99396 PREV VISIT EST AGE 40-64: CPT | Mod: 25

## 2025-01-21 PROCEDURE — 94010 BREATHING CAPACITY TEST: CPT

## 2025-01-21 RX ORDER — FLUTICASONE PROPIONATE 50 UG/1
50 SPRAY, METERED NASAL
Qty: 16 | Refills: 0 | Status: DISCONTINUED | COMMUNITY
Start: 2024-12-28

## 2025-01-21 RX ORDER — AZITHROMYCIN 250 MG/1
250 TABLET, FILM COATED ORAL
Qty: 6 | Refills: 0 | Status: DISCONTINUED | COMMUNITY
Start: 2024-12-28

## 2025-01-21 RX ORDER — PREDNISONE 20 MG/1
20 TABLET ORAL
Qty: 4 | Refills: 0 | Status: DISCONTINUED | COMMUNITY
Start: 2024-12-28

## 2025-01-22 LAB
ALBUMIN SERPL ELPH-MCNC: 4.2 G/DL
ALP BLD-CCNC: 62 U/L
ALT SERPL-CCNC: 41 U/L
ANION GAP SERPL CALC-SCNC: 12 MMOL/L
APPEARANCE: CLEAR
AST SERPL-CCNC: 30 U/L
BACTERIA: NEGATIVE /HPF
BILIRUB SERPL-MCNC: 0.5 MG/DL
BILIRUBIN URINE: NEGATIVE
BLOOD URINE: NEGATIVE
BUN SERPL-MCNC: 16 MG/DL
CALCIUM SERPL-MCNC: 9.1 MG/DL
CAST: 1 /LPF
CHLORIDE SERPL-SCNC: 103 MMOL/L
CHOLEST SERPL-MCNC: 127 MG/DL
CO2 SERPL-SCNC: 22 MMOL/L
COLOR: YELLOW
CREAT SERPL-MCNC: 0.8 MG/DL
EGFR: 107 ML/MIN/1.73M2
EPITHELIAL CELLS: 0 /HPF
GLUCOSE QUALITATIVE U: 250 MG/DL
GLUCOSE SERPL-MCNC: 200 MG/DL
HCT VFR BLD CALC: 44.1 %
HDLC SERPL-MCNC: 25 MG/DL
HGB BLD-MCNC: 13.1 G/DL
KETONES URINE: NEGATIVE MG/DL
LDLC SERPL CALC-MCNC: 43 MG/DL
LEUKOCYTE ESTERASE URINE: NEGATIVE
MCHC RBC-ENTMCNC: 19.8 PG
MCHC RBC-ENTMCNC: 29.7 G/DL
MCV RBC AUTO: 66.6 FL
MICROSCOPIC-UA: NORMAL
NITRITE URINE: NEGATIVE
NONHDLC SERPL-MCNC: 102 MG/DL
PH URINE: 5.5
PLATELET # BLD AUTO: 249 K/UL
POTASSIUM SERPL-SCNC: 4.6 MMOL/L
PROT SERPL-MCNC: 6.9 G/DL
PROTEIN URINE: NEGATIVE MG/DL
RBC # BLD: 6.62 M/UL
RBC # FLD: 17.4 %
RED BLOOD CELLS URINE: 0 /HPF
SODIUM SERPL-SCNC: 136 MMOL/L
SPECIFIC GRAVITY URINE: 1.02
TRIGL SERPL-MCNC: 394 MG/DL
UROBILINOGEN URINE: 0.2 MG/DL
WBC # FLD AUTO: 4.59 K/UL
WHITE BLOOD CELLS URINE: 0 /HPF

## 2025-01-23 ENCOUNTER — APPOINTMENT (OUTPATIENT)
Dept: ENDOCRINOLOGY | Facility: CLINIC | Age: 50
End: 2025-01-23

## 2025-01-23 VITALS
TEMPERATURE: 97.8 F | DIASTOLIC BLOOD PRESSURE: 90 MMHG | BODY MASS INDEX: 34.65 KG/M2 | OXYGEN SATURATION: 96 % | HEART RATE: 78 BPM | SYSTOLIC BLOOD PRESSURE: 130 MMHG | HEIGHT: 74 IN | WEIGHT: 270 LBS

## 2025-01-23 DIAGNOSIS — E27.9 DISORDER OF ADRENAL GLAND, UNSPECIFIED: ICD-10-CM

## 2025-01-23 DIAGNOSIS — R03.0 ELEVATED BLOOD-PRESSURE READING, W/OUT DIAGNOSIS OF HYPERTENSION: ICD-10-CM

## 2025-01-23 DIAGNOSIS — E78.5 HYPERLIPIDEMIA, UNSPECIFIED: ICD-10-CM

## 2025-01-23 DIAGNOSIS — E11.9 TYPE 2 DIABETES MELLITUS W/OUT COMPLICATIONS: ICD-10-CM

## 2025-01-23 DIAGNOSIS — E66.9 OBESITY, UNSPECIFIED: ICD-10-CM

## 2025-01-23 LAB
GLUCOSE BLDC GLUCOMTR-MCNC: 143
HBA1C MFR BLD HPLC: 8.4

## 2025-01-23 PROCEDURE — G2211 COMPLEX E/M VISIT ADD ON: CPT | Mod: NC

## 2025-01-23 PROCEDURE — 99214 OFFICE O/P EST MOD 30 MIN: CPT

## 2025-01-23 PROCEDURE — 83036 HEMOGLOBIN GLYCOSYLATED A1C: CPT | Mod: QW

## 2025-01-23 PROCEDURE — 82962 GLUCOSE BLOOD TEST: CPT

## 2025-01-23 RX ORDER — BLOOD-GLUCOSE METER
KIT MISCELLANEOUS
Qty: 3 | Refills: 3 | Status: ACTIVE | COMMUNITY
Start: 2025-01-23 | End: 1900-01-01

## 2025-01-23 RX ORDER — BLOOD-GLUCOSE METER
W/DEVICE KIT MISCELLANEOUS
Qty: 1 | Refills: 0 | Status: ACTIVE | COMMUNITY
Start: 2025-01-23 | End: 1900-01-01

## 2025-01-23 RX ORDER — LANCETS 33 GAUGE
EACH MISCELLANEOUS
Qty: 3 | Refills: 3 | Status: ACTIVE | COMMUNITY
Start: 2025-01-23 | End: 1900-01-01

## 2025-01-24 DIAGNOSIS — E55.9 VITAMIN D DEFICIENCY, UNSPECIFIED: ICD-10-CM

## 2025-01-24 LAB
25(OH)D3 SERPL-MCNC: 16.4 NG/ML
ALBUMIN SERPL ELPH-MCNC: 4.6 G/DL
ALP BLD-CCNC: 63 U/L
ALT SERPL-CCNC: 41 U/L
ANION GAP SERPL CALC-SCNC: 12 MMOL/L
AST SERPL-CCNC: 27 U/L
BILIRUB SERPL-MCNC: 0.8 MG/DL
BUN SERPL-MCNC: 16 MG/DL
CALCIUM SERPL-MCNC: 9.8 MG/DL
CHLORIDE SERPL-SCNC: 101 MMOL/L
CHOLEST SERPL-MCNC: 126 MG/DL
CO2 SERPL-SCNC: 24 MMOL/L
CREAT SERPL-MCNC: 0.93 MG/DL
CREAT SPEC-SCNC: 221 MG/DL
EGFR: 99 ML/MIN/1.73M2
ESTIMATED AVERAGE GLUCOSE: 209 MG/DL
FRUCTOSAMINE SERPL-MCNC: 300 UMOL/L
GLUCOSE SERPL-MCNC: 128 MG/DL
GLYCOMARK.: 3.8 UG/ML
HBA1C MFR BLD HPLC: 8.9 %
HDLC SERPL-MCNC: 29 MG/DL
LDLC SERPL CALC-MCNC: 52 MG/DL
LDLC SERPL DIRECT ASSAY-MCNC: 60 MG/DL
MAGNESIUM SERPL-MCNC: 2 MG/DL
MICROALBUMIN 24H UR DL<=1MG/L-MCNC: <1.2 MG/DL
MICROALBUMIN/CREAT 24H UR-RTO: NORMAL MG/G
NONHDLC SERPL-MCNC: 98 MG/DL
POTASSIUM SERPL-SCNC: 4.5 MMOL/L
PROT SERPL-MCNC: 7.3 G/DL
SODIUM SERPL-SCNC: 138 MMOL/L
T3FREE SERPL-MCNC: 3.47 PG/ML
T4 FREE SERPL-MCNC: 0.9 NG/DL
TRIGL SERPL-MCNC: 297 MG/DL
TSH SERPL-ACNC: 1.44 UIU/ML

## 2025-01-24 RX ORDER — ERGOCALCIFEROL 1.25 MG/1
1.25 MG CAPSULE, LIQUID FILLED ORAL
Qty: 12 | Refills: 1 | Status: ACTIVE | COMMUNITY
Start: 2025-01-24 | End: 1900-01-01

## 2025-01-29 ENCOUNTER — TRANSCRIPTION ENCOUNTER (OUTPATIENT)
Age: 50
End: 2025-01-29

## 2025-01-30 ENCOUNTER — TRANSCRIPTION ENCOUNTER (OUTPATIENT)
Age: 50
End: 2025-01-30

## 2025-02-13 RX ORDER — TIRZEPATIDE 2.5 MG/.5ML
2.5 INJECTION, SOLUTION SUBCUTANEOUS
Qty: 1 | Refills: 1 | Status: ACTIVE | COMMUNITY
Start: 2025-02-13 | End: 1900-01-01

## 2025-02-24 ENCOUNTER — TRANSCRIPTION ENCOUNTER (OUTPATIENT)
Age: 50
End: 2025-02-24

## 2025-04-17 ENCOUNTER — TRANSCRIPTION ENCOUNTER (OUTPATIENT)
Age: 50
End: 2025-04-17

## 2025-04-17 RX ORDER — TIRZEPATIDE 5 MG/.5ML
5 INJECTION, SOLUTION SUBCUTANEOUS
Qty: 1 | Refills: 2 | Status: ACTIVE | COMMUNITY
Start: 2025-04-17 | End: 1900-01-01

## 2025-05-05 ENCOUNTER — TRANSCRIPTION ENCOUNTER (OUTPATIENT)
Age: 50
End: 2025-05-05

## 2025-05-12 ENCOUNTER — TRANSCRIPTION ENCOUNTER (OUTPATIENT)
Age: 50
End: 2025-05-12

## 2025-05-14 ENCOUNTER — TRANSCRIPTION ENCOUNTER (OUTPATIENT)
Age: 50
End: 2025-05-14

## 2025-05-15 ENCOUNTER — TRANSCRIPTION ENCOUNTER (OUTPATIENT)
Age: 50
End: 2025-05-15

## 2025-05-27 ENCOUNTER — RX RENEWAL (OUTPATIENT)
Age: 50
End: 2025-05-27

## 2025-06-13 ENCOUNTER — TRANSCRIPTION ENCOUNTER (OUTPATIENT)
Age: 50
End: 2025-06-13

## 2025-06-14 RX ORDER — TIRZEPATIDE 7.5 MG/.5ML
7.5 INJECTION, SOLUTION SUBCUTANEOUS
Qty: 3 | Refills: 1 | Status: ACTIVE | COMMUNITY
Start: 2025-06-13 | End: 1900-01-01

## 2025-06-26 ENCOUNTER — APPOINTMENT (OUTPATIENT)
Dept: INTERNAL MEDICINE | Facility: CLINIC | Age: 50
End: 2025-06-26
Payer: COMMERCIAL

## 2025-06-26 ENCOUNTER — LABORATORY RESULT (OUTPATIENT)
Age: 50
End: 2025-06-26

## 2025-06-26 VITALS
BODY MASS INDEX: 32.85 KG/M2 | DIASTOLIC BLOOD PRESSURE: 72 MMHG | WEIGHT: 256 LBS | HEART RATE: 75 BPM | SYSTOLIC BLOOD PRESSURE: 128 MMHG | OXYGEN SATURATION: 97 % | HEIGHT: 74 IN

## 2025-06-26 PROBLEM — Z12.9 CANCER SCREENING: Status: ACTIVE | Noted: 2025-06-26

## 2025-06-26 PROCEDURE — 99386 PREV VISIT NEW AGE 40-64: CPT

## 2025-06-26 PROCEDURE — 93000 ELECTROCARDIOGRAM COMPLETE: CPT

## 2025-06-27 ENCOUNTER — TRANSCRIPTION ENCOUNTER (OUTPATIENT)
Age: 50
End: 2025-06-27

## 2025-06-27 LAB
25(OH)D3 SERPL-MCNC: 26.6 NG/ML
ALBUMIN SERPL ELPH-MCNC: 4.6 G/DL
ALP BLD-CCNC: 48 U/L
ALT SERPL-CCNC: 27 U/L
ANION GAP SERPL CALC-SCNC: 15 MMOL/L
APPEARANCE: CLEAR
AST SERPL-CCNC: 23 U/L
BACTERIA: NEGATIVE /HPF
BASOPHILS # BLD AUTO: 0.07 K/UL
BASOPHILS NFR BLD AUTO: 1 %
BILIRUB SERPL-MCNC: 0.9 MG/DL
BILIRUBIN URINE: NEGATIVE
BLOOD URINE: NEGATIVE
BUN SERPL-MCNC: 19 MG/DL
CALCIUM SERPL-MCNC: 9.6 MG/DL
CAST: 0 /LPF
CHLORIDE SERPL-SCNC: 103 MMOL/L
CHOLEST SERPL-MCNC: 165 MG/DL
CK SERPL-CCNC: 186 U/L
CO2 SERPL-SCNC: 21 MMOL/L
COLOR: YELLOW
CREAT SERPL-MCNC: 1.04 MG/DL
CREAT SPEC-SCNC: 173 MG/DL
EGFRCR SERPLBLD CKD-EPI 2021: 87 ML/MIN/1.73M2
EOSINOPHIL # BLD AUTO: 0.44 K/UL
EOSINOPHIL NFR BLD AUTO: 6.3 %
EPITHELIAL CELLS: 0 /HPF
ESTIMATED AVERAGE GLUCOSE: 120 MG/DL
FERRITIN SERPL-MCNC: 527 NG/ML
FOLATE SERPL-MCNC: 17.8 NG/ML
FRUCTOSAMINE SERPL-MCNC: 218 UMOL/L
GLUCOSE QUALITATIVE U: NEGATIVE MG/DL
GLUCOSE SERPL-MCNC: 84 MG/DL
GLYCOMARK.: 18.1 UG/ML
HBA1C MFR BLD HPLC: 5.8 %
HCT VFR BLD CALC: 42.3 %
HDLC SERPL-MCNC: 30 MG/DL
HGB BLD-MCNC: 13.3 G/DL
IMM GRANULOCYTES NFR BLD AUTO: 0.1 %
IRON SATN MFR SERPL: 36 %
IRON SERPL-MCNC: 104 UG/DL
KETONES URINE: NEGATIVE MG/DL
LDLC SERPL-MCNC: 114 MG/DL
LEUKOCYTE ESTERASE URINE: NEGATIVE
LYMPHOCYTES # BLD AUTO: 1.75 K/UL
LYMPHOCYTES NFR BLD AUTO: 25.1 %
MAN DIFF?: NORMAL
MCHC RBC-ENTMCNC: 20.1 PG
MCHC RBC-ENTMCNC: 31.4 G/DL
MCV RBC AUTO: 63.9 FL
MICROALBUMIN 24H UR DL<=1MG/L-MCNC: <1.2 MG/DL
MICROALBUMIN/CREAT 24H UR-RTO: NORMAL MG/G
MICROSCOPIC-UA: NORMAL
MONOCYTES # BLD AUTO: 0.46 K/UL
MONOCYTES NFR BLD AUTO: 6.6 %
NEUTROPHILS # BLD AUTO: 4.25 K/UL
NEUTROPHILS NFR BLD AUTO: 60.9 %
NITRITE URINE: NEGATIVE
NONHDLC SERPL-MCNC: 135 MG/DL
PH URINE: 5.5
PLATELET # BLD AUTO: 271 K/UL
POTASSIUM SERPL-SCNC: 4.6 MMOL/L
PROT SERPL-MCNC: 7.4 G/DL
PROTEIN URINE: NEGATIVE MG/DL
PSA SERPL-MCNC: 0.41 NG/ML
RBC # BLD: 6.62 M/UL
RBC # FLD: 17.6 %
RED BLOOD CELLS URINE: 0 /HPF
SODIUM SERPL-SCNC: 139 MMOL/L
SPECIFIC GRAVITY URINE: 1.02
T4 FREE SERPL-MCNC: 1 NG/DL
TIBC SERPL-MCNC: 289 UG/DL
TRIGL SERPL-MCNC: 115 MG/DL
TSH SERPL-ACNC: 1.15 UIU/ML
UIBC SERPL-MCNC: 185 UG/DL
URATE SERPL-MCNC: 8.1 MG/DL
UROBILINOGEN URINE: 0.2 MG/DL
VIT B12 SERPL-MCNC: 298 PG/ML
WBC # FLD AUTO: 6.98 K/UL
WHITE BLOOD CELLS URINE: 1 /HPF

## 2025-08-22 ENCOUNTER — TRANSCRIPTION ENCOUNTER (OUTPATIENT)
Age: 50
End: 2025-08-22

## 2025-09-02 ENCOUNTER — TRANSCRIPTION ENCOUNTER (OUTPATIENT)
Age: 50
End: 2025-09-02

## 2025-09-08 ENCOUNTER — TRANSCRIPTION ENCOUNTER (OUTPATIENT)
Age: 50
End: 2025-09-08

## (undated) DEVICE — WARMING BLANKET UPPER ADULT

## (undated) DEVICE — ENDOCATCH 10MM SPECIMEN POUCH

## (undated) DEVICE — D HELP - CLEARVIEW CLEARIFY SYSTEM

## (undated) DEVICE — VENODYNE/SCD SLEEVE CALF LARGE

## (undated) DEVICE — ELCTR CORD FOOTSWITCH 1PLR LAPSCP 10FT

## (undated) DEVICE — INSUFFLATION NDL COVIDIEN STEP 14G FOR STEP/VERSASTEP

## (undated) DEVICE — TUBING STRYKEFLOW II SUCTION / IRRIGATOR

## (undated) DEVICE — SUT POLYSORB 0 36" GU-46

## (undated) DEVICE — SUT MONOCRYL 4-0 27" PS-2 UNDYED

## (undated) DEVICE — SOL IRR POUR NS 0.9% 500ML

## (undated) DEVICE — DRAPE MAYO STAND 30"

## (undated) DEVICE — PREP CHLORAPREP HI-LITE ORANGE 26ML

## (undated) DEVICE — SOL IRR POUR H2O 250ML

## (undated) DEVICE — MARKING PEN W RULER

## (undated) DEVICE — BLADE SCALPEL SAFETYLOCK #11

## (undated) DEVICE — SHEARS COVIDIEN ENDO SHEAR 5MM X 31CM W UNIPOLAR CAUTERY

## (undated) DEVICE — TROCAR COVIDIEN VERSAPORT BLADELESS OPTICAL 5MM STANDARD

## (undated) DEVICE — DRSG OPSITE 2.5 X 2"

## (undated) DEVICE — POSITIONER FOAM EGG CRATE ULNAR 2PCS (PINK)

## (undated) DEVICE — TUBING INSUFFLATION LAP FILTER 10FT

## (undated) DEVICE — DRSG TELFA 3 X 8

## (undated) DEVICE — TUBING PLUME AWAY 4.0

## (undated) DEVICE — SOL IRR POUR NS 0.9% 1000ML

## (undated) DEVICE — TROCAR ETHICON ENDOPATH XCEL BLADELESS 5MM X 100MM STABILITY

## (undated) DEVICE — GLV 7 PROTEXIS (WHITE)

## (undated) DEVICE — APPLICATOR FOR ARISTA XL 38CM

## (undated) DEVICE — GLV 7.5 PROTEXIS (WHITE)

## (undated) DEVICE — TROCAR COVIDIEN VERSAPORT BLADELESS OPTICAL 12MM STANDARD

## (undated) DEVICE — MEDICATION LABELS W MARKER

## (undated) DEVICE — GLV 6.5 PROTEXIS (WHITE)

## (undated) DEVICE — PACK ADVANCED LAPAROSCOPIC NS

## (undated) DEVICE — DRAPE TOWEL BLUE 17" X 24"

## (undated) DEVICE — DRAPE GENERAL ENDOSCOPY

## (undated) DEVICE — DRSG STERISTRIPS 0.5 X 4"

## (undated) DEVICE — GOWN TRIMAX LG

## (undated) DEVICE — ELCTR BOVIE PENCIL HANDPIECE

## (undated) DEVICE — SUT PDS II 0 18" ENDOLOOP LIGATURE